# Patient Record
Sex: FEMALE | Employment: OTHER | URBAN - METROPOLITAN AREA
[De-identification: names, ages, dates, MRNs, and addresses within clinical notes are randomized per-mention and may not be internally consistent; named-entity substitution may affect disease eponyms.]

---

## 2019-12-04 ENCOUNTER — APPOINTMENT (OUTPATIENT)
Dept: GENERAL RADIOLOGY | Age: 74
DRG: 191 | End: 2019-12-04
Attending: EMERGENCY MEDICINE
Payer: MEDICARE

## 2019-12-04 ENCOUNTER — HOSPITAL ENCOUNTER (INPATIENT)
Age: 74
LOS: 2 days | Discharge: HOME | DRG: 191 | End: 2019-12-08
Attending: EMERGENCY MEDICINE | Admitting: INTERNAL MEDICINE
Payer: MEDICARE

## 2019-12-04 ENCOUNTER — APPOINTMENT (OUTPATIENT)
Dept: CT IMAGING | Age: 74
DRG: 191 | End: 2019-12-04
Attending: PHYSICIAN ASSISTANT
Payer: MEDICARE

## 2019-12-04 DIAGNOSIS — J44.1 CHRONIC OBSTRUCTIVE PULMONARY DISEASE WITH ACUTE EXACERBATION (HCC): Primary | ICD-10-CM

## 2019-12-04 LAB
ALBUMIN SERPL-MCNC: 3.9 G/DL (ref 3.5–5)
ALBUMIN/GLOB SERPL: 1.1 {RATIO} (ref 1.1–2.2)
ALP SERPL-CCNC: 74 U/L (ref 45–117)
ALT SERPL-CCNC: 26 U/L (ref 12–78)
ANION GAP SERPL CALC-SCNC: 8 MMOL/L (ref 5–15)
ARTERIAL PATENCY WRIST A: ABNORMAL
AST SERPL-CCNC: 20 U/L (ref 15–37)
BASE DEFICIT BLD-SCNC: 6 MMOL/L
BASOPHILS # BLD: 0.1 K/UL (ref 0–0.1)
BASOPHILS NFR BLD: 1 % (ref 0–1)
BDY SITE: ABNORMAL
BILIRUB SERPL-MCNC: 0.2 MG/DL (ref 0.2–1)
BUN SERPL-MCNC: 22 MG/DL (ref 6–20)
BUN/CREAT SERPL: 15 (ref 12–20)
CALCIUM SERPL-MCNC: 9.1 MG/DL (ref 8.5–10.1)
CHLORIDE SERPL-SCNC: 112 MMOL/L (ref 97–108)
CO2 SERPL-SCNC: 22 MMOL/L (ref 21–32)
CREAT SERPL-MCNC: 1.49 MG/DL (ref 0.55–1.02)
DIFFERENTIAL METHOD BLD: ABNORMAL
EOSINOPHIL # BLD: 0.3 K/UL (ref 0–0.4)
EOSINOPHIL NFR BLD: 2 % (ref 0–7)
ERYTHROCYTE [DISTWIDTH] IN BLOOD BY AUTOMATED COUNT: 14.8 % (ref 11.5–14.5)
GAS FLOW.O2 O2 DELIVERY SYS: ABNORMAL L/MIN
GLOBULIN SER CALC-MCNC: 3.5 G/DL (ref 2–4)
GLUCOSE SERPL-MCNC: 91 MG/DL (ref 65–100)
HCO3 BLD-SCNC: 19 MMOL/L (ref 22–26)
HCT VFR BLD AUTO: 44.6 % (ref 35–47)
HGB BLD-MCNC: 14.7 G/DL (ref 11.5–16)
IMM GRANULOCYTES # BLD AUTO: 0.1 K/UL (ref 0–0.04)
IMM GRANULOCYTES NFR BLD AUTO: 1 % (ref 0–0.5)
LACTATE BLD-SCNC: 0.88 MMOL/L (ref 0.4–2)
LYMPHOCYTES # BLD: 4 K/UL (ref 0.8–3.5)
LYMPHOCYTES NFR BLD: 28 % (ref 12–49)
MCH RBC QN AUTO: 29.9 PG (ref 26–34)
MCHC RBC AUTO-ENTMCNC: 33 G/DL (ref 30–36.5)
MCV RBC AUTO: 90.7 FL (ref 80–99)
MONOCYTES # BLD: 1.1 K/UL (ref 0–1)
MONOCYTES NFR BLD: 8 % (ref 5–13)
NEUTS SEG # BLD: 8.6 K/UL (ref 1.8–8)
NEUTS SEG NFR BLD: 60 % (ref 32–75)
NRBC # BLD: 0 K/UL (ref 0–0.01)
NRBC BLD-RTO: 0 PER 100 WBC
PCO2 BLD: 31.3 MMHG (ref 35–45)
PH BLD: 7.39 [PH] (ref 7.35–7.45)
PLATELET # BLD AUTO: 349 K/UL (ref 150–400)
PMV BLD AUTO: 9.2 FL (ref 8.9–12.9)
PO2 BLD: 69 MMHG (ref 80–100)
POTASSIUM SERPL-SCNC: 3.9 MMOL/L (ref 3.5–5.1)
PROT SERPL-MCNC: 7.4 G/DL (ref 6.4–8.2)
RBC # BLD AUTO: 4.92 M/UL (ref 3.8–5.2)
SAO2 % BLD: 94 % (ref 92–97)
SODIUM SERPL-SCNC: 142 MMOL/L (ref 136–145)
SPECIMEN TYPE: ABNORMAL
TOTAL RESP. RATE, ITRR: 18
WBC # BLD AUTO: 14.3 K/UL (ref 3.6–11)

## 2019-12-04 PROCEDURE — 71250 CT THORAX DX C-: CPT

## 2019-12-04 PROCEDURE — 74011000258 HC RX REV CODE- 258: Performed by: PHYSICIAN ASSISTANT

## 2019-12-04 PROCEDURE — 74011000250 HC RX REV CODE- 250: Performed by: PHYSICIAN ASSISTANT

## 2019-12-04 PROCEDURE — 71046 X-RAY EXAM CHEST 2 VIEWS: CPT

## 2019-12-04 PROCEDURE — 74011250636 HC RX REV CODE- 250/636: Performed by: PHYSICIAN ASSISTANT

## 2019-12-04 PROCEDURE — 36415 COLL VENOUS BLD VENIPUNCTURE: CPT

## 2019-12-04 PROCEDURE — 85025 COMPLETE CBC W/AUTO DIFF WBC: CPT

## 2019-12-04 PROCEDURE — 36600 WITHDRAWAL OF ARTERIAL BLOOD: CPT

## 2019-12-04 PROCEDURE — 87040 BLOOD CULTURE FOR BACTERIA: CPT

## 2019-12-04 PROCEDURE — 74011250636 HC RX REV CODE- 250/636: Performed by: HOSPITALIST

## 2019-12-04 PROCEDURE — 74011250637 HC RX REV CODE- 250/637: Performed by: HOSPITALIST

## 2019-12-04 PROCEDURE — 83605 ASSAY OF LACTIC ACID: CPT

## 2019-12-04 PROCEDURE — 82803 BLOOD GASES ANY COMBINATION: CPT

## 2019-12-04 PROCEDURE — 96367 TX/PROPH/DG ADDL SEQ IV INF: CPT

## 2019-12-04 PROCEDURE — 94640 AIRWAY INHALATION TREATMENT: CPT

## 2019-12-04 PROCEDURE — 96374 THER/PROPH/DIAG INJ IV PUSH: CPT

## 2019-12-04 PROCEDURE — 96375 TX/PRO/DX INJ NEW DRUG ADDON: CPT

## 2019-12-04 PROCEDURE — 99218 HC RM OBSERVATION: CPT

## 2019-12-04 PROCEDURE — 96372 THER/PROPH/DIAG INJ SC/IM: CPT

## 2019-12-04 PROCEDURE — 99285 EMERGENCY DEPT VISIT HI MDM: CPT

## 2019-12-04 PROCEDURE — 96365 THER/PROPH/DIAG IV INF INIT: CPT

## 2019-12-04 PROCEDURE — 80053 COMPREHEN METABOLIC PANEL: CPT

## 2019-12-04 PROCEDURE — 77030029684 HC NEB SM VOL KT MONA -A

## 2019-12-04 RX ORDER — ACETAMINOPHEN, DIPHENHYDRAMINE HCL, PHENYLEPHRINE HCL 325; 25; 5 MG/1; MG/1; MG/1
10 TABLET ORAL
COMMUNITY

## 2019-12-04 RX ORDER — ENOXAPARIN SODIUM 100 MG/ML
40 INJECTION SUBCUTANEOUS EVERY 24 HOURS
Status: DISCONTINUED | OUTPATIENT
Start: 2019-12-04 | End: 2019-12-05

## 2019-12-04 RX ORDER — AMLODIPINE BESYLATE 5 MG/1
5 TABLET ORAL DAILY
Status: ON HOLD | COMMUNITY
End: 2019-12-08 | Stop reason: SDUPTHER

## 2019-12-04 RX ORDER — SODIUM CHLORIDE 0.9 % (FLUSH) 0.9 %
5-40 SYRINGE (ML) INJECTION EVERY 8 HOURS
Status: DISCONTINUED | OUTPATIENT
Start: 2019-12-04 | End: 2019-12-08 | Stop reason: HOSPADM

## 2019-12-04 RX ORDER — IPRATROPIUM BROMIDE AND ALBUTEROL SULFATE 2.5; .5 MG/3ML; MG/3ML
3 SOLUTION RESPIRATORY (INHALATION)
Status: COMPLETED | OUTPATIENT
Start: 2019-12-04 | End: 2019-12-04

## 2019-12-04 RX ORDER — DONEPEZIL HYDROCHLORIDE 10 MG/1
10 TABLET, FILM COATED ORAL
COMMUNITY

## 2019-12-04 RX ORDER — IPRATROPIUM BROMIDE AND ALBUTEROL SULFATE 2.5; .5 MG/3ML; MG/3ML
3 SOLUTION RESPIRATORY (INHALATION)
Status: DISCONTINUED | OUTPATIENT
Start: 2019-12-04 | End: 2019-12-08 | Stop reason: HOSPADM

## 2019-12-04 RX ORDER — VITAMIN E 268 MG
400 CAPSULE ORAL DAILY
COMMUNITY

## 2019-12-04 RX ORDER — DULOXETIN HYDROCHLORIDE 30 MG/1
30 CAPSULE, DELAYED RELEASE ORAL DAILY
COMMUNITY

## 2019-12-04 RX ORDER — TRAZODONE HYDROCHLORIDE 100 MG/1
100 TABLET ORAL
Status: DISCONTINUED | OUTPATIENT
Start: 2019-12-04 | End: 2019-12-08 | Stop reason: HOSPADM

## 2019-12-04 RX ORDER — IPRATROPIUM BROMIDE AND ALBUTEROL SULFATE 2.5; .5 MG/3ML; MG/3ML
3 SOLUTION RESPIRATORY (INHALATION)
COMMUNITY

## 2019-12-04 RX ORDER — ERGOCALCIFEROL 1.25 MG/1
50000 CAPSULE ORAL
COMMUNITY

## 2019-12-04 RX ORDER — GUAIFENESIN/DEXTROMETHORPHAN 100-10MG/5
10 SYRUP ORAL
Status: DISCONTINUED | OUTPATIENT
Start: 2019-12-04 | End: 2019-12-08 | Stop reason: HOSPADM

## 2019-12-04 RX ORDER — LANOLIN ALCOHOL/MO/W.PET/CERES
1000 CREAM (GRAM) TOPICAL DAILY
COMMUNITY

## 2019-12-04 RX ORDER — TRAZODONE HYDROCHLORIDE 100 MG/1
100 TABLET ORAL
COMMUNITY

## 2019-12-04 RX ORDER — DULOXETIN HYDROCHLORIDE 30 MG/1
30 CAPSULE, DELAYED RELEASE ORAL DAILY
Status: DISCONTINUED | OUTPATIENT
Start: 2019-12-05 | End: 2019-12-08 | Stop reason: HOSPADM

## 2019-12-04 RX ORDER — ALBUTEROL SULFATE 0.83 MG/ML
2.5 SOLUTION RESPIRATORY (INHALATION)
Status: ACTIVE | OUTPATIENT
Start: 2019-12-04 | End: 2019-12-05

## 2019-12-04 RX ORDER — DONEPEZIL HYDROCHLORIDE 5 MG/1
10 TABLET, FILM COATED ORAL
Status: DISCONTINUED | OUTPATIENT
Start: 2019-12-04 | End: 2019-12-08 | Stop reason: HOSPADM

## 2019-12-04 RX ORDER — AMLODIPINE BESYLATE 5 MG/1
5 TABLET ORAL DAILY
Status: DISCONTINUED | OUTPATIENT
Start: 2019-12-05 | End: 2019-12-05

## 2019-12-04 RX ORDER — MEMANTINE HYDROCHLORIDE 10 MG/1
10 TABLET ORAL 2 TIMES DAILY
COMMUNITY

## 2019-12-04 RX ORDER — MEMANTINE HYDROCHLORIDE 10 MG/1
10 TABLET ORAL 2 TIMES DAILY
Status: DISCONTINUED | OUTPATIENT
Start: 2019-12-04 | End: 2019-12-08 | Stop reason: HOSPADM

## 2019-12-04 RX ORDER — BENZONATATE 100 MG/1
200 CAPSULE ORAL 3 TIMES DAILY
Status: DISCONTINUED | OUTPATIENT
Start: 2019-12-04 | End: 2019-12-08 | Stop reason: HOSPADM

## 2019-12-04 RX ORDER — LANOLIN ALCOHOL/MO/W.PET/CERES
9 CREAM (GRAM) TOPICAL
Status: DISCONTINUED | OUTPATIENT
Start: 2019-12-04 | End: 2019-12-08 | Stop reason: HOSPADM

## 2019-12-04 RX ORDER — GABAPENTIN 400 MG/1
400 CAPSULE ORAL DAILY
COMMUNITY

## 2019-12-04 RX ORDER — SODIUM CHLORIDE 0.9 % (FLUSH) 0.9 %
5-40 SYRINGE (ML) INJECTION AS NEEDED
Status: DISCONTINUED | OUTPATIENT
Start: 2019-12-04 | End: 2019-12-08 | Stop reason: HOSPADM

## 2019-12-04 RX ORDER — GABAPENTIN 400 MG/1
800 CAPSULE ORAL
COMMUNITY

## 2019-12-04 RX ADMIN — MELATONIN 9 MG: at 21:56

## 2019-12-04 RX ADMIN — DONEPEZIL HYDROCHLORIDE 10 MG: 5 TABLET, FILM COATED ORAL at 21:56

## 2019-12-04 RX ADMIN — Medication 5 ML: at 21:56

## 2019-12-04 RX ADMIN — IPRATROPIUM BROMIDE AND ALBUTEROL SULFATE 3 ML: .5; 3 SOLUTION RESPIRATORY (INHALATION) at 15:36

## 2019-12-04 RX ADMIN — METHYLPREDNISOLONE SODIUM SUCCINATE 125 MG: 125 INJECTION, POWDER, FOR SOLUTION INTRAMUSCULAR; INTRAVENOUS at 16:03

## 2019-12-04 RX ADMIN — BENZONATATE 200 MG: 100 CAPSULE ORAL at 21:56

## 2019-12-04 RX ADMIN — ENOXAPARIN SODIUM 40 MG: 40 INJECTION SUBCUTANEOUS at 21:56

## 2019-12-04 RX ADMIN — AZITHROMYCIN MONOHYDRATE 500 MG: 500 INJECTION, POWDER, LYOPHILIZED, FOR SOLUTION INTRAVENOUS at 23:10

## 2019-12-04 RX ADMIN — GABAPENTIN 800 MG: 100 CAPSULE ORAL at 21:56

## 2019-12-04 RX ADMIN — TRAZODONE HYDROCHLORIDE 100 MG: 100 TABLET ORAL at 21:55

## 2019-12-04 RX ADMIN — METHYLPREDNISOLONE SODIUM SUCCINATE 40 MG: 40 INJECTION, POWDER, FOR SOLUTION INTRAMUSCULAR; INTRAVENOUS at 23:11

## 2019-12-04 RX ADMIN — CEFTRIAXONE 1 G: 1 INJECTION, POWDER, FOR SOLUTION INTRAMUSCULAR; INTRAVENOUS at 21:57

## 2019-12-04 RX ADMIN — MEMANTINE HYDROCHLORIDE 10 MG: 10 TABLET ORAL at 21:59

## 2019-12-04 NOTE — ED PROVIDER NOTES
EMERGENCY DEPARTMENT HISTORY AND PHYSICAL EXAM      Date: 12/4/2019  Patient Name: Cora Francis    History of Presenting Illness     Chief Complaint   Patient presents with    Referral / Consult     Patient's PCP referred her here for pnemonia rule out she states she had bronchitis and it has not went away       History Provided By: Patient    HPI: Cora Francis, 66 y.o. female with PMHx significant for COPD, asthma, presents to the ED with cc of cough and shortness of breath. For the last \"few months\", the patient has had a productive cough. The cough is been gradually worsening and she has developed shortness of breath. She has seen her primary care provider, who has prescribed 2 rounds of antibiotics and steroids without relief of symptoms. When she went to see her PCP again today, she was referred to the emergency department for admission and IV antibiotics. She reports intermittent fevers and chest discomfort while coughing. She denies lower extremity edema or pain, abdominal pain, vomiting, diarrhea. She recently moved here with [place]. There are no other complaints, changes, or physical findings at this time. PCP: None    No current facility-administered medications on file prior to encounter. No current outpatient medications on file prior to encounter. Past History     Past Medical History:  No past medical history on file. COPD, asthma, Parkinson's disease, CKD, prediabetes    Past Surgical History:  No past surgical history on file. Family History:  No family history on file. Social History:  Social History     Tobacco Use    Smoking status: Current everyday smoker   Substance Use Topics    Alcohol use: Not on file    Drug use: Not on file       Allergies: Allergies   Allergen Reactions    Bee Venom Protein (Honey Bee) Itching    Codeine Hives    Tomato Hives         Review of Systems   Review of Systems   Constitutional: Positive for chills and fever.    HENT: Negative for ear pain and sore throat. Eyes: Negative for redness and visual disturbance. Respiratory: Positive for cough, shortness of breath and wheezing. Cardiovascular: Negative for chest pain and palpitations. Gastrointestinal: Negative for abdominal pain, nausea and vomiting. Genitourinary: Negative for dysuria and hematuria. Musculoskeletal: Negative for back pain and gait problem. Skin: Negative for rash and wound. Neurological: Negative for dizziness and headaches. Psychiatric/Behavioral: Negative for behavioral problems and confusion. All other systems reviewed and are negative. Physical Exam   Physical Exam  Vitals signs and nursing note reviewed. Constitutional:       Appearance: She is well-developed. Comments: Alert, conversant female who is able to speak in short phrases but then becomes dyspneic. She has a resting tremor. HENT:      Head: Normocephalic and atraumatic. Eyes:      Conjunctiva/sclera: Conjunctivae normal.      Pupils: Pupils are equal, round, and reactive to light. Neck:      Musculoskeletal: Normal range of motion and neck supple. Cardiovascular:      Rate and Rhythm: Normal rate and regular rhythm. Heart sounds: Normal heart sounds. Pulmonary:      Comments: Pt is mildly tachypneic with frequent coughing. Lung sounds are coarse throughout with expiratory wheezes in the bases. She is not in respiratory distress. Musculoskeletal: Normal range of motion. Skin:     General: Skin is warm and dry. Findings: No rash. Neurological:      Mental Status: She is alert and oriented to person, place, and time. GCS: GCS eye subscore is 4. GCS verbal subscore is 5. GCS motor subscore is 6. Cranial Nerves: No cranial nerve deficit. Sensory: No sensory deficit.    Psychiatric:         Behavior: Behavior normal.           Diagnostic Study Results     Labs -     Recent Results (from the past 12 hour(s))   POC LACTIC ACID Collection Time: 12/04/19  4:16 PM   Result Value Ref Range    Lactic Acid (POC) 0.88 0.40 - 2.00 mmol/L   CBC WITH AUTOMATED DIFF    Collection Time: 12/04/19  4:24 PM   Result Value Ref Range    WBC 14.3 (H) 3.6 - 11.0 K/uL    RBC 4.92 3.80 - 5.20 M/uL    HGB 14.7 11.5 - 16.0 g/dL    HCT 44.6 35.0 - 47.0 %    MCV 90.7 80.0 - 99.0 FL    MCH 29.9 26.0 - 34.0 PG    MCHC 33.0 30.0 - 36.5 g/dL    RDW 14.8 (H) 11.5 - 14.5 %    PLATELET 477 658 - 301 K/uL    MPV 9.2 8.9 - 12.9 FL    NRBC 0.0 0  WBC    ABSOLUTE NRBC 0.00 0.00 - 0.01 K/uL    NEUTROPHILS 60 32 - 75 %    LYMPHOCYTES 28 12 - 49 %    MONOCYTES 8 5 - 13 %    EOSINOPHILS 2 0 - 7 %    BASOPHILS 1 0 - 1 %    IMMATURE GRANULOCYTES 1 (H) 0.0 - 0.5 %    ABS. NEUTROPHILS 8.6 (H) 1.8 - 8.0 K/UL    ABS. LYMPHOCYTES 4.0 (H) 0.8 - 3.5 K/UL    ABS. MONOCYTES 1.1 (H) 0.0 - 1.0 K/UL    ABS. EOSINOPHILS 0.3 0.0 - 0.4 K/UL    ABS. BASOPHILS 0.1 0.0 - 0.1 K/UL    ABS. IMM. GRANS. 0.1 (H) 0.00 - 0.04 K/UL    DF AUTOMATED         Radiologic Studies -   XR CHEST PA LAT   Final Result   IMPRESSION: No acute cardiopulmonary disease. CTA CHEST W OR W WO CONT    (Results Pending)     CT Results  (Last 48 hours)    None        CXR Results  (Last 48 hours)               12/04/19 1539  XR CHEST PA LAT Final result    Impression:  IMPRESSION: No acute cardiopulmonary disease. Narrative: Indication: Persistent bronchitis, evaluate for pneumonia. Exam: PA and lateral views of the chest.       There is no prior study for direct comparison. Findings: Cardiomediastinal silhouette is within normal limits. Lungs are clear   bilaterally. Pleural spaces are normal. Osseous structures are intact. Medical Decision Making   I am the first provider for this patient. I reviewed the vital signs, available nursing notes, past medical history, past surgical history, family history and social history.     Vital Signs-Reviewed the patient's vital signs. Patient Vitals for the past 12 hrs:   Pulse Resp BP SpO2   12/04/19 1536    98 %   12/04/19 1513 81 20 157/72 97 %         Records Reviewed: Nursing Notes and Old Medical Records      Provider Notes (Medical Decision Making):   DDx: pneumonia, COPD exacerbation, malignancy, interstitial lung disease    Pt is mildly tachypneic with coarse breath sounds. She is maintaining adequate oxygenation. Plan to check labs and chest x-ray. Will treat with steroids and nebulizer. ED Course:   Initial assessment performed. The patients presenting problems have been discussed, and they are in agreement with the care plan formulated and outlined with them. I have encouraged them to ask questions as they arise throughout their visit. ED Course as of Dec 05 0003   Wed Dec 04, 2019   1539 Case discussed with Dr. Ruthanne Leyden, who agrees with the plan. [MAHOGANY]   1948 Pt was ambulated with pulse ox. She was only able to walk a few steps at a time before taking a break due to dyspnea. [MAHOGANY]      ED Course User Index  [MAHOGANY] [facility]     CONSULT NOTE:   Yifan Davis PA-C spoke with Dr. Brendon Duron,   Specialty: hospitalist  Discussed pt's hx, disposition, and available diagnostic and imaging results. Reviewed care plans. Consultant agrees with plans as outlined. Disposition:    ADMIT NOTE:  7:55 PM -   The patient is being admitted to the hospital by hospitalist.  The results of their tests and reasons for their admission have been discussed with the patient and/or available family. They convey agreement and understanding for the need to be admitted and for their admission diagnosis. Diagnosis     Clinical Impression:   1. Chronic obstructive pulmonary disease with acute exacerbation ([facility]            Jose Perry.  VENITA Raya

## 2019-12-05 LAB
ANION GAP SERPL CALC-SCNC: 10 MMOL/L (ref 5–15)
B PERT DNA SPEC QL NAA+PROBE: NOT DETECTED
BUN SERPL-MCNC: 24 MG/DL (ref 6–20)
BUN/CREAT SERPL: 15 (ref 12–20)
C PNEUM DNA SPEC QL NAA+PROBE: NOT DETECTED
CALCIUM SERPL-MCNC: 9 MG/DL (ref 8.5–10.1)
CHLORIDE SERPL-SCNC: 117 MMOL/L (ref 97–108)
CO2 SERPL-SCNC: 17 MMOL/L (ref 21–32)
CREAT SERPL-MCNC: 1.55 MG/DL (ref 0.55–1.02)
FLUAV H1 2009 PAND RNA SPEC QL NAA+PROBE: NOT DETECTED
FLUAV H1 RNA SPEC QL NAA+PROBE: NOT DETECTED
FLUAV H3 RNA SPEC QL NAA+PROBE: NOT DETECTED
FLUAV SUBTYP SPEC NAA+PROBE: NOT DETECTED
FLUBV RNA SPEC QL NAA+PROBE: NOT DETECTED
GLUCOSE SERPL-MCNC: 151 MG/DL (ref 65–100)
HADV DNA SPEC QL NAA+PROBE: NOT DETECTED
HCOV 229E RNA SPEC QL NAA+PROBE: NOT DETECTED
HCOV HKU1 RNA SPEC QL NAA+PROBE: NOT DETECTED
HCOV NL63 RNA SPEC QL NAA+PROBE: NOT DETECTED
HCOV OC43 RNA SPEC QL NAA+PROBE: NOT DETECTED
HMPV RNA SPEC QL NAA+PROBE: NOT DETECTED
HPIV1 RNA SPEC QL NAA+PROBE: NOT DETECTED
HPIV2 RNA SPEC QL NAA+PROBE: NOT DETECTED
HPIV3 RNA SPEC QL NAA+PROBE: NOT DETECTED
HPIV4 RNA SPEC QL NAA+PROBE: NOT DETECTED
M PNEUMO DNA SPEC QL NAA+PROBE: NOT DETECTED
POTASSIUM SERPL-SCNC: 4.3 MMOL/L (ref 3.5–5.1)
RSV RNA SPEC QL NAA+PROBE: NOT DETECTED
RV+EV RNA SPEC QL NAA+PROBE: NOT DETECTED
SODIUM SERPL-SCNC: 144 MMOL/L (ref 136–145)

## 2019-12-05 PROCEDURE — 74011250636 HC RX REV CODE- 250/636: Performed by: HOSPITALIST

## 2019-12-05 PROCEDURE — 94640 AIRWAY INHALATION TREATMENT: CPT

## 2019-12-05 PROCEDURE — 96366 THER/PROPH/DIAG IV INF ADDON: CPT

## 2019-12-05 PROCEDURE — 74011250636 HC RX REV CODE- 250/636: Performed by: INTERNAL MEDICINE

## 2019-12-05 PROCEDURE — 74011000250 HC RX REV CODE- 250: Performed by: HOSPITALIST

## 2019-12-05 PROCEDURE — 80048 BASIC METABOLIC PNL TOTAL CA: CPT

## 2019-12-05 PROCEDURE — 0099U RESPIRATORY PANEL,PCR,NASOPHARYNGEAL: CPT

## 2019-12-05 PROCEDURE — 74011250637 HC RX REV CODE- 250/637: Performed by: HOSPITALIST

## 2019-12-05 PROCEDURE — 99218 HC RM OBSERVATION: CPT

## 2019-12-05 PROCEDURE — 96376 TX/PRO/DX INJ SAME DRUG ADON: CPT

## 2019-12-05 PROCEDURE — 74011250637 HC RX REV CODE- 250/637: Performed by: INTERNAL MEDICINE

## 2019-12-05 PROCEDURE — 94760 N-INVAS EAR/PLS OXIMETRY 1: CPT

## 2019-12-05 PROCEDURE — 96372 THER/PROPH/DIAG INJ SC/IM: CPT

## 2019-12-05 PROCEDURE — 36415 COLL VENOUS BLD VENIPUNCTURE: CPT

## 2019-12-05 RX ORDER — HYDRALAZINE HYDROCHLORIDE 20 MG/ML
10 INJECTION INTRAMUSCULAR; INTRAVENOUS
Status: DISCONTINUED | OUTPATIENT
Start: 2019-12-05 | End: 2019-12-08 | Stop reason: HOSPADM

## 2019-12-05 RX ORDER — FLUTICASONE FUROATE AND VILANTEROL 100; 25 UG/1; UG/1
1 POWDER RESPIRATORY (INHALATION) DAILY
Status: DISCONTINUED | OUTPATIENT
Start: 2019-12-05 | End: 2019-12-08 | Stop reason: HOSPADM

## 2019-12-05 RX ORDER — AMLODIPINE BESYLATE 5 MG/1
10 TABLET ORAL DAILY
Status: DISCONTINUED | OUTPATIENT
Start: 2019-12-05 | End: 2019-12-08 | Stop reason: HOSPADM

## 2019-12-05 RX ORDER — ONDANSETRON 2 MG/ML
4 INJECTION INTRAMUSCULAR; INTRAVENOUS
Status: DISCONTINUED | OUTPATIENT
Start: 2019-12-05 | End: 2019-12-08 | Stop reason: HOSPADM

## 2019-12-05 RX ORDER — ACETAMINOPHEN 325 MG/1
650 TABLET ORAL
Status: DISCONTINUED | OUTPATIENT
Start: 2019-12-05 | End: 2019-12-08 | Stop reason: HOSPADM

## 2019-12-05 RX ORDER — SODIUM CHLORIDE 9 MG/ML
75 INJECTION, SOLUTION INTRAVENOUS CONTINUOUS
Status: DISPENSED | OUTPATIENT
Start: 2019-12-05 | End: 2019-12-05

## 2019-12-05 RX ORDER — HYDROCODONE BITARTRATE AND ACETAMINOPHEN 5; 325 MG/1; MG/1
1 TABLET ORAL ONCE
Status: COMPLETED | OUTPATIENT
Start: 2019-12-05 | End: 2019-12-05

## 2019-12-05 RX ORDER — GUAIFENESIN 600 MG/1
600 TABLET, EXTENDED RELEASE ORAL EVERY 12 HOURS
Status: DISCONTINUED | OUTPATIENT
Start: 2019-12-05 | End: 2019-12-08 | Stop reason: HOSPADM

## 2019-12-05 RX ORDER — ENOXAPARIN SODIUM 100 MG/ML
30 INJECTION SUBCUTANEOUS EVERY 24 HOURS
Status: DISCONTINUED | OUTPATIENT
Start: 2019-12-05 | End: 2019-12-08 | Stop reason: HOSPADM

## 2019-12-05 RX ADMIN — MELATONIN 9 MG: at 22:27

## 2019-12-05 RX ADMIN — METHYLPREDNISOLONE SODIUM SUCCINATE 40 MG: 40 INJECTION, POWDER, FOR SOLUTION INTRAMUSCULAR; INTRAVENOUS at 05:21

## 2019-12-05 RX ADMIN — BENZONATATE 200 MG: 100 CAPSULE ORAL at 15:55

## 2019-12-05 RX ADMIN — GABAPENTIN 400 MG: 100 CAPSULE ORAL at 08:04

## 2019-12-05 RX ADMIN — BENZONATATE 200 MG: 100 CAPSULE ORAL at 21:21

## 2019-12-05 RX ADMIN — AMLODIPINE BESYLATE 10 MG: 5 TABLET ORAL at 08:04

## 2019-12-05 RX ADMIN — MEMANTINE HYDROCHLORIDE 10 MG: 10 TABLET ORAL at 08:05

## 2019-12-05 RX ADMIN — Medication 10 ML: at 14:21

## 2019-12-05 RX ADMIN — Medication 5 ML: at 05:22

## 2019-12-05 RX ADMIN — METHYLPREDNISOLONE SODIUM SUCCINATE 40 MG: 40 INJECTION, POWDER, FOR SOLUTION INTRAMUSCULAR; INTRAVENOUS at 21:22

## 2019-12-05 RX ADMIN — ENOXAPARIN SODIUM 30 MG: 30 INJECTION SUBCUTANEOUS at 20:15

## 2019-12-05 RX ADMIN — ACETAMINOPHEN 650 MG: 325 TABLET ORAL at 08:52

## 2019-12-05 RX ADMIN — GABAPENTIN 800 MG: 100 CAPSULE ORAL at 22:31

## 2019-12-05 RX ADMIN — GUAIFENESIN AND DEXTROMETHORPHAN 10 ML: 100; 10 SYRUP ORAL at 20:14

## 2019-12-05 RX ADMIN — DONEPEZIL HYDROCHLORIDE 10 MG: 5 TABLET, FILM COATED ORAL at 21:22

## 2019-12-05 RX ADMIN — GUAIFENESIN 600 MG: 600 TABLET, EXTENDED RELEASE ORAL at 22:26

## 2019-12-05 RX ADMIN — METHYLPREDNISOLONE SODIUM SUCCINATE 40 MG: 40 INJECTION, POWDER, FOR SOLUTION INTRAMUSCULAR; INTRAVENOUS at 14:21

## 2019-12-05 RX ADMIN — Medication 10 ML: at 21:22

## 2019-12-05 RX ADMIN — HYDROCODONE BITARTRATE AND ACETAMINOPHEN 1 TABLET: 5; 325 TABLET ORAL at 21:20

## 2019-12-05 RX ADMIN — TRAZODONE HYDROCHLORIDE 100 MG: 100 TABLET ORAL at 22:27

## 2019-12-05 RX ADMIN — ACETAMINOPHEN 650 MG: 325 TABLET ORAL at 15:54

## 2019-12-05 RX ADMIN — MEMANTINE HYDROCHLORIDE 10 MG: 10 TABLET ORAL at 18:07

## 2019-12-05 RX ADMIN — SODIUM CHLORIDE 75 ML/HR: 900 INJECTION, SOLUTION INTRAVENOUS at 08:55

## 2019-12-05 RX ADMIN — IPRATROPIUM BROMIDE AND ALBUTEROL SULFATE 3 ML: .5; 3 SOLUTION RESPIRATORY (INHALATION) at 06:00

## 2019-12-05 RX ADMIN — BENZONATATE 200 MG: 100 CAPSULE ORAL at 08:04

## 2019-12-05 RX ADMIN — DULOXETINE HYDROCHLORIDE 30 MG: 30 CAPSULE, DELAYED RELEASE ORAL at 08:04

## 2019-12-05 RX ADMIN — AZITHROMYCIN MONOHYDRATE 500 MG: 500 INJECTION, POWDER, LYOPHILIZED, FOR SOLUTION INTRAVENOUS at 23:09

## 2019-12-05 RX ADMIN — FLUTICASONE FUROATE AND VILANTEROL TRIFENATATE 1 PUFF: 100; 25 POWDER RESPIRATORY (INHALATION) at 08:02

## 2019-12-05 RX ADMIN — GUAIFENESIN 600 MG: 600 TABLET, EXTENDED RELEASE ORAL at 08:05

## 2019-12-05 NOTE — PROGRESS NOTES
Oncology End of Shift Note      Bedside shift change report given to ZION Arellano (incoming nurse) by Jerry Hickey (outgoing nurse) on [facility]. Report included the following information SBAR, Kardex and MAR. Shift Summary:   Patient tolerated care well throughout shift. Hourly rounding completed. Education provided regarding all medications and room safety. Patient ambulated in room with stand by. Vitals assessed. Issues for Physician to Address:       Patient on Cardiac Monitoring?     [] Yes  [x] No    Rhythm:         Shift Events        Jerry Hickey

## 2019-12-05 NOTE — ED NOTES
TRANSFER - OUT REPORT:    Verbal report given to ZION Gonzalez(name) on Quinn Mcadams  being transferred to [id number](unit) for routine progression of care       Report consisted of patients Situation, Background, Assessment and   Recommendations(SBAR). Information from the following report(s) SBAR, Kardex, ED Summary, [facility] and Recent Results was reviewed with the receiving nurse. Lines:   Peripheral IV 12/04/19 Right Hand (Active)   Site Assessment Clean, dry, & intact 12/4/2019  4:32 PM   Phlebitis Assessment 0 12/4/2019  4:32 PM   Infiltration Assessment 0 12/4/2019  4:32 PM   Dressing Status Clean, dry, & intact 12/4/2019  4:32 PM   Dressing Type Transparent 12/4/2019  4:32 PM   Hub Color/Line Status Pink 12/4/2019  4:32 PM        Opportunity for questions and clarification was provided.       Patient transported with:   Tech/Transport

## 2019-12-05 NOTE — ED NOTES
Report received from [facility]. Advised of patient's chief complaint, orders that are completed, any outstanding orders that still need to be completed, and the current treatment plan. All questions addressed prior to assumption of patient care at this time.

## 2019-12-05 NOTE — H&P
Hospitalist Admission Note    NAME: Quinn Mcadams   :  1945   MRN:  [id number]     Date/Time:  2019 8:26 PM    Patient PCP: None  ______________________________________________________________________  Given the patient's current clinical presentation, I have a high level of concern for decompensation if discharged from the emergency department. Complex decision making was performed, which includes reviewing the patient's available past medical records, laboratory results, and x-ray films. My assessment of this patient's clinical condition and my plan of care is as follows. Assessment / Plan:    Exertional shortness of breath and cough  Secondary COPD with acute exacerbation   due to Acute bronchitis, POA  Continuous Tobacco abuse  -sats in mid 90's on RA, RR 20  -afebrile, normal wbc and CXR neg for PNA  -initiated on IV steroids, PRN duo nebs  -empiric azithromycin  -Resp panel  -start on breo-ellipta   -scheduled tessalon pearls and PRN robitussin  -outpt pulm referral  counseled extensively regarding tobacco use  -nicotine patch offered    Hypertension POA  -continue amlodipine    Parkinson's disease per pt's PCP  -not on treatment or seen a neurologist yet    Dementia  -diagnosed by PCP in [place]  -cont namenda and Aricept    On gabapentin and cymbalta, pt doesn't now why she takes this medications        Code Status: full   Surrogate Decision Maker: NOK sister, Kadeem Woody, has son who is sick and in hospital    DVT Prophylaxis: lovenox  GI Prophylaxis: not indicated    Baseline: lives with sister      Subjective:   CHIEF COMPLAINT: shortness of breath and cough    HISTORY OF PRESENT ILLNESS:      Quinn Mcadams is a 66 y.o. PMHx significant for COPD,  continuous tobacco abuse, dementia, Parkinson's disease presented to ER with above symptoms.   Patient states she has been having symptoms for almost 2 months now initially it started with upper respiratory tract symptoms but her cough never improved. She has tried outpatient antibiotics with no improvement. Denies any fever or sore throat. Cough is productive of yellow sputum. States her sister had similar symptoms but her symptoms have since resolved. Patient has never seen a pulmonologist for COPD. She recently moved from [place] to [place] to take care of her sister she does have a primary care physician here. In ER she was noticed to be tachypneic with respiratory rate in 20s but her sats have been in 90s on room air. ER work-up is negative for any acute infection. She received multiple treatment of nebs and IV steroids with minimal improvement. We were asked to admit for work up and evaluation of the above problems. No past medical history on file. No past surgical history on file. Social History     Tobacco Use    Smoking status: Not on file   Substance Use Topics    Alcohol use: Not on file        Family history: htn. Allergies   Allergen Reactions    Bee Venom Protein (Honey Bee) Itching    Codeine Hives    Tomato Hives        Prior to Admission medications    Medication Sig Start Date End Date Taking? Authorizing Provider   amLODIPine (NORVASC) 5 mg tablet Take 5 mg by mouth daily. Yes Provider, Historical   donepezil (ARICEPT) 10 mg tablet Take 10 mg by mouth nightly. Yes Provider, Historical   DULoxetine (CYMBALTA) 30 mg capsule Take 30 mg by mouth daily. Yes Provider, Historical   gabapentin (NEURONTIN) 400 mg capsule Take 400 mg by mouth daily. Patient takes 400 mg QAM and 800 mg QHS   Yes Provider, Historical   gabapentin (NEURONTIN) 400 mg capsule Take 800 mg by mouth nightly. Patient takes 400 mg QAM and 800 mg QHS   Yes Provider, Historical   albuterol-ipratropium (DUO-NEB) 2.5 mg-0.5 mg/3 ml nebu 3 mL by Nebulization route every six (6) hours as needed (SOB). Yes Provider, Historical   traZODone (DESYREL) 100 mg tablet Take 100 mg by mouth nightly.    Yes Provider, Historical   melatonin 10 mg tab Take 10 mg by mouth nightly. Yes Provider, Historical   L-LYSINE PO Take 1 Tab by mouth nightly. Yes Provider, Historical   vitamin E (AQUA GEMS) 400 unit capsule Take 400 Units by mouth daily. Yes Provider, Historical   ergocalciferol (ERGOCALCIFEROL) 50,000 unit capsule Take 50,000 Units by mouth Every Saturday. Yes Provider, Historical   cyanocobalamin 1,000 mcg tablet Take 1,000 mcg by mouth daily. Yes Provider, Historical   memantine (NAMENDA) 10 mg tablet Take 10 mg by mouth two (2) times a day. Yes Provider, Historical       REVIEW OF SYSTEMS:     I am not able to complete the review of systems because:    The patient is intubated and sedated    The patient has altered mental status due to his acute medical problems    The patient has baseline aphasia from prior stroke(s)    The patient has baseline dementia and is not reliable historian    The patient is in acute medical distress and unable to provide information           Total of 12 systems reviewed as follows:       POSITIVE= underlined text  Negative = text not underlined  General:  fever, chills, sweats, generalized weakness, weight loss/gain,      loss of appetite   Eyes:    blurred vision, eye pain, loss of vision, double vision  ENT:    rhinorrhea, pharyngitis   Respiratory:   cough, sputum production, SOB, MONTANO, wheezing, pleuritic pain   Cardiology:   chest pain, palpitations, orthopnea, PND, edema, syncope   Gastrointestinal:  abdominal pain , N/V, diarrhea, dysphagia, constipation, bleeding   Genitourinary:  frequency, urgency, dysuria, hematuria, incontinence   Muskuloskeletal :  arthralgia, myalgia, back pain  Hematology:  easy bruising, nose or gum bleeding, lymphadenopathy   Dermatological: rash, ulceration, pruritis, color change / jaundice  Endocrine:   hot flashes or polydipsia   Neurological:  headache, dizziness, confusion, focal weakness, paresthesia,     Speech difficulties, memory loss, gait difficulty  Psychological: Feelings of anxiety, depression, agitation    Objective:   VITALS:    Visit Vitals  /77   Pulse 80   Temp 98.1 °F (36.7 °C)   Resp 20   Ht 5' 1\" (1.549 m)   Wt 77.6 kg (171 lb 1.2 oz)   SpO2 95%   BMI 32.32 kg/m²       PHYSICAL EXAM:    General:    Alert, cooperative, no distress, appears stated age. HEENT: Atraumatic, anicteric sclerae, pink conjunctivae     No oral ulcers, mucosa moist, throat clear, dentition fair  Neck:  Supple, symmetrical,  thyroid: non tender  Lungs:   Harsh b/l with wheezing on right side  Chest wall:  No tenderness  No Accessory muscle use. Heart:   Regular  rhythm,  No  murmur   No edema  Abdomen:   Soft, non-tender. Not distended. Bowel sounds normal  Extremities: No cyanosis. No clubbing,      Skin turgor normal, Capillary refill normal, Radial dial pulse 2+  Skin:     Not pale. Not Jaundiced  No rashes   Psych:  Good insight. Not depressed. Not anxious or agitated. Neurologic: EOMs intact. No facial asymmetry. No aphasia or slurred speech. Symmetrical strength, Sensation grossly intact.  Alert and oriented X 4.     _______________________________________________________________________  Care Plan discussed with:    Comments   Patient x    Family      RN x    Care Manager                    Consultant:      _______________________________________________________________________  Expected  Disposition:   Home with Family x   HH/PT/OT/RN    SNF/LTC    DAVID    ________________________________________________________________________  TOTAL TIME:   Minutes    Critical Care Provided     Minutes non procedure based      Comments     Reviewed previous records   >50% of visit spent in counseling and coordination of care  Discussion with patient and/or family and questions answered       ________________________________________________________________________  Signed: Medina Harris MD    Procedures: see electronic medical records for all procedures/Xrays and details which were not copied into this note but were reviewed prior to creation of Plan. LAB DATA REVIEWED:    Recent Results (from the past 24 hour(s))   POC LACTIC ACID    Collection Time: 12/04/19  4:16 PM   Result Value Ref Range    Lactic Acid (POC) 0.88 0.40 - 2.00 mmol/L   CBC WITH AUTOMATED DIFF    Collection Time: 12/04/19  4:24 PM   Result Value Ref Range    WBC 14.3 (H) 3.6 - 11.0 K/uL    RBC 4.92 3.80 - 5.20 M/uL    HGB 14.7 11.5 - 16.0 g/dL    HCT 44.6 35.0 - 47.0 %    MCV 90.7 80.0 - 99.0 FL    MCH 29.9 26.0 - 34.0 PG    MCHC 33.0 30.0 - 36.5 g/dL    RDW 14.8 (H) 11.5 - 14.5 %    PLATELET 719 419 - 330 K/uL    MPV 9.2 8.9 - 12.9 FL    NRBC 0.0 0  WBC    ABSOLUTE NRBC 0.00 0.00 - 0.01 K/uL    NEUTROPHILS 60 32 - 75 %    LYMPHOCYTES 28 12 - 49 %    MONOCYTES 8 5 - 13 %    EOSINOPHILS 2 0 - 7 %    BASOPHILS 1 0 - 1 %    IMMATURE GRANULOCYTES 1 (H) 0.0 - 0.5 %    ABS. NEUTROPHILS 8.6 (H) 1.8 - 8.0 K/UL    ABS. LYMPHOCYTES 4.0 (H) 0.8 - 3.5 K/UL    ABS. MONOCYTES 1.1 (H) 0.0 - 1.0 K/UL    ABS. EOSINOPHILS 0.3 0.0 - 0.4 K/UL    ABS. BASOPHILS 0.1 0.0 - 0.1 K/UL    ABS. IMM. GRANS. 0.1 (H) 0.00 - 0.04 K/UL    DF AUTOMATED     METABOLIC PANEL, COMPREHENSIVE    Collection Time: 12/04/19  4:24 PM   Result Value Ref Range    Sodium 142 136 - 145 mmol/L    Potassium 3.9 3.5 - 5.1 mmol/L    Chloride 112 (H) 97 - 108 mmol/L    CO2 22 21 - 32 mmol/L    Anion gap 8 5 - 15 mmol/L    Glucose 91 65 - 100 mg/dL    BUN 22 (H) 6 - 20 MG/DL    Creatinine 1.49 (H) 0.55 - 1.02 MG/DL    BUN/Creatinine ratio 15 12 - 20      GFR est AA 41 (L) >60 ml/min/1.73m2    GFR est non-AA 34 (L) >60 ml/min/1.73m2    Calcium 9.1 8.5 - 10.1 MG/DL    Bilirubin, total 0.2 0.2 - 1.0 MG/DL    ALT (SGPT) 26 12 - 78 U/L    AST (SGOT) 20 15 - 37 U/L    Alk.  phosphatase 74 45 - 117 U/L    Protein, total 7.4 6.4 - 8.2 g/dL    Albumin 3.9 3.5 - 5.0 g/dL    Globulin 3.5 2.0 - 4.0 g/dL    A-G Ratio 1.1 1.1 - 2.2

## 2019-12-05 NOTE — PROGRESS NOTES
Pharmacy Clarification of Prior to Admission Medication Regimen     The patient was interviewed regarding clarification of the prior to admission medication regimen and was questioned regarding use of any other inhalers, topical products, over the counter medications, herbal medications, vitamin products or ophthalmic/nasal/otic medication use. Information Obtained From: Patient, prescription bottles, RX Query    Pertinent Pharmacy Findings:  Updated patients preferred outpatient pharmacy to: [place] [address], [address] [facility] medication list was corrected to the following:     Prior to Admission Medications   Prescriptions Last Dose Informant Taking? DULoxetine (CYMBALTA) 30 mg capsule 12/4/2019 at Unknown time Self Yes   Sig: Take 30 mg by mouth daily. L-LYSINE PO 12/3/2019 at Unknown time Self Yes   Sig: Take 1 Tab by mouth nightly. albuterol-ipratropium (DUO-NEB) 2.5 mg-0.5 mg/3 ml nebu 12/4/2019 at Unknown time Self Yes   Sig: 3 mL by Nebulization route every six (6) hours as needed (SOB). amLODIPine (NORVASC) 5 mg tablet 12/4/2019 at Unknown time Self Yes   Sig: Take 5 mg by mouth daily. cyanocobalamin 1,000 mcg tablet 12/4/2019 at Unknown time Self Yes   Sig: Take 1,000 mcg by mouth daily. donepezil (ARICEPT) 10 mg tablet 12/3/2019 at Unknown time Self Yes   Sig: Take 10 mg by mouth nightly.   ergocalciferol (ERGOCALCIFEROL) 50,000 unit capsule 11/30/2019 at Unknown time Other Yes   Sig: Take 50,000 Units by mouth Every Saturday. gabapentin (NEURONTIN) 400 mg capsule 12/4/2019 at Unknown time Self Yes   Sig: Take 400 mg by mouth daily. Patient takes 400 mg QAM and 800 mg QHS   gabapentin (NEURONTIN) 400 mg capsule 12/3/2019 at Unknown time Self Yes   Sig: Take 800 mg by mouth nightly. Patient takes 400 mg QAM and 800 mg QHS   melatonin 10 mg tab 12/3/2019 at Unknown time Self Yes   Sig: Take 10 mg by mouth nightly.    memantine (NAMENDA) 10 mg tablet 12/4/2019 at Unknown time Other Yes   Sig: Take 10 mg by mouth two (2) times a day. traZODone (DESYREL) 100 mg tablet 12/3/2019 at Unknown time Self Yes   Sig: Take 100 mg by mouth nightly. vitamin E (AQUA GEMS) 400 unit capsule 12/4/2019 at Unknown time Self Yes   Sig: Take 400 Units by mouth daily.       Facility-Administered Medications: None          Junior Pendleton CPhT  Medication History Pharmacy Technician

## 2019-12-05 NOTE — PROGRESS NOTES
Hospitalist Progress Note    NAME: Milo Steward   :  1945   MRN:  [id number]       Assessment / Plan:  Exertional shortness of breath and cough  Secondary COPD with acute exacerbation   due to Acute bronchitis, POA  Continuous Tobacco abuse  -maintaining SpO2 ~94% on RA. She is very congested, takes break during conversation. Apparently symptoms has been on going for 3 weeks. -CXR neg for PNA  -cont' empiric abx, IV steroids, PRN duo nebs  -add mucinex, breo  -Resp panel pending  -start on breo-ellipta   -outpt pulm referral  -counseled extensively regarding tobacco use  -nicotine patch offered     PREMA vs CKD3?  -no previous labs. Cr at 1.5 on admission  -avoid nephrotoxic agents. -encourage PO intake, start IVF x12 hrs  -repeat bmp in the AM    Hypertension POA  -continue amlodipine, increase dose  -add IV hydralazine prn     Parkinson's disease per pt's PCP  -not on treatment or seen a neurologist yet. Needs referral at discharge     Dementia  -diagnosed by PCP in [place]. She was AAOx4 during my visit.  -cont namenda and Aricept     On gabapentin and cymbalta, pt doesn't now why she takes this medications           Code Status: full   Surrogate Decision Maker: NOK sister, Khadijah Burns, has son who is sick and in hospital   DVT Prophylaxis: lovenox  GI Prophylaxis: not indicated   Baseline: lives with sister  Overweight / Body mass index is 32.32 kg/m². Subjective:     Chief Complaint / Reason for Physician Visit  Pt seen. Denies cp but has sob with during conversations. She is congested, cough but unable to bring up sputum. Discussed with RN events overnight.      Review of Systems:  Symptom Y/N Comments  Symptom Y/N Comments   Fever/Chills n   Chest Pain n    Poor Appetite    Edema     Cough y   Abdominal Pain     Sputum n   Joint Pain     SOB/MONTANO y   Pruritis/Rash     Nausea/vomit    Tolerating PT/OT     Diarrhea    Tolerating Diet     Constipation    Other       Could NOT obtain due to:      Objective:     VITALS:   Last 24hrs VS reviewed since prior progress note. Most recent are:  Patient Vitals for the past 24 hrs:   Temp Pulse Resp BP SpO2   12/05/19 0541     94 %   12/04/19 2207 98.3 °F (36.8 °C) 66 20 170/83    12/04/19 1915    136/77 95 %   12/04/19 1815 98.1 °F (36.7 °C) 80  159/70 95 %   12/04/19 1700    138/73 95 %   12/04/19 1645    (!) 134/97 96 %   12/04/19 1615    140/73 96 %   12/04/19 1536     98 %   12/04/19 1530    (!) 176/93 99 %   12/04/19 1513 98.2 °F (36.8 °C) 81 20 157/72 97 %       Intake/Output Summary (Last 24 hours) at 12/5/2019 0731  Last data filed at 12/5/2019 0110  Gross per 24 hour   Intake 750 ml   Output    Net 750 ml        PHYSICAL EXAM:  General: WD, WN. Alert, cooperative, no acute distress    EENT:  EOMI. Anicteric sclerae. MMM  Resp:  Coarse bs b/l. no wheezing or rales. No accessory muscle use  CV:  Regular  rhythm,  No edema  GI:  Soft, Non distended, Non tender.  +Bowel sounds  Neurologic:  Alert and oriented X 3, normal speech  Psych:   Not anxious nor agitated  Skin:  No rashes. No jaundice    Reviewed most current lab test results and cultures  YES  Reviewed most current radiology test results   YES  Review and summation of old records today    NO  Reviewed patient's current orders and MAR    YES  PMH/SH reviewed - no change compared to H&P  ________________________________________________________________________  Care Plan discussed with:    Comments   Patient x    Family      RN x    Care Manager     Consultant                        Multidiciplinary team rounds were held today with , nursing, pharmacist and clinical coordinator. Patient's plan of care was discussed; medications were reviewed and discharge planning was addressed.      ________________________________________________________________________  Total NON critical care TIME:  35  Minutes    Total CRITICAL CARE TIME Spent:   Minutes non procedure based      Comments   >50% of visit spent in counseling and coordination of care     ________________________________________________________________________  Ellouise Alpers, MD     Procedures: see electronic medical records for all procedures/Xrays and details which were not copied into this note but were reviewed prior to creation of Plan. LABS:  I reviewed today's most current labs and imaging studies.   Pertinent labs include:  Recent Labs     12/04/19  1624   WBC 14.3*   HGB 14.7   HCT 44.6        Recent Labs     12/05/19  0527 12/04/19  1624    142   K 4.3 3.9   * 112*   CO2 17* 22   * 91   BUN 24* 22*   CREA 1.55* 1.49*   CA 9.0 9.1   ALB  --  3.9   TBILI  --  0.2   SGOT  --  20   ALT  --  26       Signed: Ellouise Alpers, MD

## 2019-12-05 NOTE — ED NOTES
Pt ambulated in hallway with ED tech. Pt oxygen sats stayed in normal range but patient was very short of breath and had to stop every couple feet to catch her breath. Pt had audible wheezing as well. MINNIE Raya made aware.

## 2019-12-05 NOTE — ED NOTES
Patient presents to the ED ambulatory complaining of a cough. Patient reports her PCP referred her to the ED to rule out pneumonia. Patient reports a productive cough, wheezing and shortness of breath for the last month. Patient states she was referred to the ED weeks ago but was unable to come to the ED. Patient placed on the monitor x2 and provided with her call aldridge. VENITA Raya at bedside.

## 2019-12-06 PROBLEM — J44.9 COPD (CHRONIC OBSTRUCTIVE PULMONARY DISEASE) (HCC): Status: ACTIVE | Noted: 2019-12-06

## 2019-12-06 LAB
ANION GAP SERPL CALC-SCNC: 8 MMOL/L (ref 5–15)
BASOPHILS # BLD: 0 K/UL (ref 0–0.1)
BASOPHILS NFR BLD: 0 % (ref 0–1)
BUN SERPL-MCNC: 24 MG/DL (ref 6–20)
BUN/CREAT SERPL: 17 (ref 12–20)
CALCIUM SERPL-MCNC: 8.4 MG/DL (ref 8.5–10.1)
CHLORIDE SERPL-SCNC: 115 MMOL/L (ref 97–108)
CO2 SERPL-SCNC: 20 MMOL/L (ref 21–32)
CREAT SERPL-MCNC: 1.43 MG/DL (ref 0.55–1.02)
DIFFERENTIAL METHOD BLD: ABNORMAL
EOSINOPHIL # BLD: 0 K/UL (ref 0–0.4)
EOSINOPHIL NFR BLD: 0 % (ref 0–7)
ERYTHROCYTE [DISTWIDTH] IN BLOOD BY AUTOMATED COUNT: 15.1 % (ref 11.5–14.5)
GLUCOSE SERPL-MCNC: 158 MG/DL (ref 65–100)
HCT VFR BLD AUTO: 42.2 % (ref 35–47)
HGB BLD-MCNC: 13.7 G/DL (ref 11.5–16)
IMM GRANULOCYTES # BLD AUTO: 0.3 K/UL (ref 0–0.04)
IMM GRANULOCYTES NFR BLD AUTO: 1 % (ref 0–0.5)
LYMPHOCYTES # BLD: 1.6 K/UL (ref 0.8–3.5)
LYMPHOCYTES NFR BLD: 6 % (ref 12–49)
MCH RBC QN AUTO: 29.8 PG (ref 26–34)
MCHC RBC AUTO-ENTMCNC: 32.5 G/DL (ref 30–36.5)
MCV RBC AUTO: 91.7 FL (ref 80–99)
MONOCYTES # BLD: 0.8 K/UL (ref 0–1)
MONOCYTES NFR BLD: 3 % (ref 5–13)
NEUTS SEG # BLD: 23.7 K/UL (ref 1.8–8)
NEUTS SEG NFR BLD: 90 % (ref 32–75)
NRBC # BLD: 0 K/UL (ref 0–0.01)
NRBC BLD-RTO: 0 PER 100 WBC
PLATELET # BLD AUTO: 329 K/UL (ref 150–400)
PMV BLD AUTO: 9.1 FL (ref 8.9–12.9)
POTASSIUM SERPL-SCNC: 4.5 MMOL/L (ref 3.5–5.1)
PROCALCITONIN SERPL-MCNC: <0.05 NG/ML
RBC # BLD AUTO: 4.6 M/UL (ref 3.8–5.2)
RBC MORPH BLD: ABNORMAL
SODIUM SERPL-SCNC: 143 MMOL/L (ref 136–145)
WBC # BLD AUTO: 26.4 K/UL (ref 3.6–11)

## 2019-12-06 PROCEDURE — 85025 COMPLETE CBC W/AUTO DIFF WBC: CPT

## 2019-12-06 PROCEDURE — 74011250636 HC RX REV CODE- 250/636: Performed by: INTERNAL MEDICINE

## 2019-12-06 PROCEDURE — 74011250636 HC RX REV CODE- 250/636: Performed by: HOSPITALIST

## 2019-12-06 PROCEDURE — 94640 AIRWAY INHALATION TREATMENT: CPT

## 2019-12-06 PROCEDURE — 96376 TX/PRO/DX INJ SAME DRUG ADON: CPT

## 2019-12-06 PROCEDURE — 97116 GAIT TRAINING THERAPY: CPT

## 2019-12-06 PROCEDURE — 74011000250 HC RX REV CODE- 250: Performed by: HOSPITALIST

## 2019-12-06 PROCEDURE — 80048 BASIC METABOLIC PNL TOTAL CA: CPT

## 2019-12-06 PROCEDURE — 84145 PROCALCITONIN (PCT): CPT

## 2019-12-06 PROCEDURE — 94760 N-INVAS EAR/PLS OXIMETRY 1: CPT

## 2019-12-06 PROCEDURE — 65270000029 HC RM PRIVATE

## 2019-12-06 PROCEDURE — 74011250637 HC RX REV CODE- 250/637: Performed by: INTERNAL MEDICINE

## 2019-12-06 PROCEDURE — 36415 COLL VENOUS BLD VENIPUNCTURE: CPT

## 2019-12-06 PROCEDURE — 99218 HC RM OBSERVATION: CPT

## 2019-12-06 PROCEDURE — 74011250637 HC RX REV CODE- 250/637: Performed by: NURSE PRACTITIONER

## 2019-12-06 PROCEDURE — 74011250637 HC RX REV CODE- 250/637: Performed by: HOSPITALIST

## 2019-12-06 PROCEDURE — 97161 PT EVAL LOW COMPLEX 20 MIN: CPT

## 2019-12-06 RX ORDER — TRAMADOL HYDROCHLORIDE 50 MG/1
50 TABLET ORAL
Status: DISCONTINUED | OUTPATIENT
Start: 2019-12-06 | End: 2019-12-08 | Stop reason: HOSPADM

## 2019-12-06 RX ORDER — BENZONATATE 100 MG/1
100 CAPSULE ORAL 3 TIMES DAILY
Status: DISCONTINUED | OUTPATIENT
Start: 2019-12-06 | End: 2019-12-06

## 2019-12-06 RX ADMIN — ACETAMINOPHEN 650 MG: 325 TABLET ORAL at 10:19

## 2019-12-06 RX ADMIN — Medication 10 ML: at 06:25

## 2019-12-06 RX ADMIN — METHYLPREDNISOLONE SODIUM SUCCINATE 40 MG: 40 INJECTION, POWDER, FOR SOLUTION INTRAMUSCULAR; INTRAVENOUS at 06:24

## 2019-12-06 RX ADMIN — ENOXAPARIN SODIUM 30 MG: 30 INJECTION SUBCUTANEOUS at 21:12

## 2019-12-06 RX ADMIN — IPRATROPIUM BROMIDE AND ALBUTEROL SULFATE 3 ML: .5; 3 SOLUTION RESPIRATORY (INHALATION) at 10:19

## 2019-12-06 RX ADMIN — METHYLPREDNISOLONE SODIUM SUCCINATE 40 MG: 40 INJECTION, POWDER, FOR SOLUTION INTRAMUSCULAR; INTRAVENOUS at 13:34

## 2019-12-06 RX ADMIN — BENZONATATE 200 MG: 100 CAPSULE ORAL at 17:36

## 2019-12-06 RX ADMIN — ACETAMINOPHEN 650 MG: 325 TABLET ORAL at 03:35

## 2019-12-06 RX ADMIN — DULOXETINE HYDROCHLORIDE 30 MG: 30 CAPSULE, DELAYED RELEASE ORAL at 08:35

## 2019-12-06 RX ADMIN — GUAIFENESIN AND DEXTROMETHORPHAN 10 ML: 100; 10 SYRUP ORAL at 21:13

## 2019-12-06 RX ADMIN — TRAMADOL HYDROCHLORIDE 50 MG: 50 TABLET, FILM COATED ORAL at 18:31

## 2019-12-06 RX ADMIN — GUAIFENESIN 600 MG: 600 TABLET, EXTENDED RELEASE ORAL at 21:13

## 2019-12-06 RX ADMIN — DONEPEZIL HYDROCHLORIDE 10 MG: 5 TABLET, FILM COATED ORAL at 21:13

## 2019-12-06 RX ADMIN — MELATONIN 9 MG: at 21:13

## 2019-12-06 RX ADMIN — GABAPENTIN 800 MG: 100 CAPSULE ORAL at 21:13

## 2019-12-06 RX ADMIN — GUAIFENESIN AND DEXTROMETHORPHAN 10 ML: 100; 10 SYRUP ORAL at 03:27

## 2019-12-06 RX ADMIN — FLUTICASONE FUROATE AND VILANTEROL TRIFENATATE 1 PUFF: 100; 25 POWDER RESPIRATORY (INHALATION) at 08:36

## 2019-12-06 RX ADMIN — GUAIFENESIN AND DEXTROMETHORPHAN 10 ML: 100; 10 SYRUP ORAL at 10:19

## 2019-12-06 RX ADMIN — BENZONATATE 200 MG: 100 CAPSULE ORAL at 21:13

## 2019-12-06 RX ADMIN — BENZONATATE 200 MG: 100 CAPSULE ORAL at 08:35

## 2019-12-06 RX ADMIN — IPRATROPIUM BROMIDE AND ALBUTEROL SULFATE 3 ML: .5; 3 SOLUTION RESPIRATORY (INHALATION) at 03:54

## 2019-12-06 RX ADMIN — GUAIFENESIN 600 MG: 600 TABLET, EXTENDED RELEASE ORAL at 08:35

## 2019-12-06 RX ADMIN — MEMANTINE HYDROCHLORIDE 10 MG: 10 TABLET ORAL at 17:36

## 2019-12-06 RX ADMIN — METHYLPREDNISOLONE SODIUM SUCCINATE 40 MG: 40 INJECTION, POWDER, FOR SOLUTION INTRAMUSCULAR; INTRAVENOUS at 21:13

## 2019-12-06 RX ADMIN — AMLODIPINE BESYLATE 10 MG: 5 TABLET ORAL at 08:35

## 2019-12-06 RX ADMIN — Medication 10 ML: at 13:35

## 2019-12-06 RX ADMIN — TRAMADOL HYDROCHLORIDE 50 MG: 50 TABLET, FILM COATED ORAL at 12:33

## 2019-12-06 RX ADMIN — GABAPENTIN 400 MG: 100 CAPSULE ORAL at 08:34

## 2019-12-06 RX ADMIN — MEMANTINE HYDROCHLORIDE 10 MG: 10 TABLET ORAL at 08:35

## 2019-12-06 RX ADMIN — TRAZODONE HYDROCHLORIDE 100 MG: 100 TABLET ORAL at 21:13

## 2019-12-06 NOTE — PROGRESS NOTES
Bedside and Verbal shift change report given to Reyna Ballesteros (oncoming nurse) by Yonatan Hooker RN (offgoing nurse). Report included the following information SBAR, Kardex, MAR and Recent Results.

## 2019-12-06 NOTE — PROGRESS NOTES
Hospitalist Progress Note    NAME: Jeff Carbajal   :  1945   MRN:  [id number]       Assessment / Plan:  Exertional shortness of breath and cough  Secondary COPD with acute exacerbation   due to Acute bronchitis, POA  Continuous Tobacco abuse  - maintaining SpO2 ~94% on RA. She is very congested, takes break during conversation. Apparently symptoms has been on going for 3 weeks. -CXR neg for PNA  - procalcitonin <0.5  -continue with IV steroids, PRN duo nebs  - complete total 5 days of ABX to treat bronchitis  -Resp panel (-)  -continue with breo-ellipta & mucinex   -outpt pulm referral  -counseled extensively regarding tobacco use  -nicotine patch offered  -leukocytosis secondary to IV steroid     PREMA vs CKD3?  -no previous labs. Cr at 1.5 on admission, creat 1.4 now  -avoid nephrotoxic agents. -encourage PO intake, start IVF x12 hrs  -repeat bmp in the AM    Hypertension POA  -continue amlodipine  -add IV hydralazine prn     Parkinson's disease per pt's PCP  -not on treatment or seen a neurologist yet. Needs referral at discharge     Dementia  -diagnosed by PCP in [place]. She was AAOx4 during my visit.  -cont namenda and Aricept     Chronic pain  - pt asked for Vicodin, claims that she has been on it in the past for chronic pain.  done; no opiates for the past 2 yrs. May take ultram PRN for pain related from deep cough. Pt has been informed that we will not provide RX upon discharge           Code Status: full   Surrogate Decision Maker: NOK sister, Mis Troy, has son who is sick and in hospital   DVT Prophylaxis: lovenox  GI Prophylaxis: not indicated   Baseline: lives with sister  Overweight / Body mass index is 32.32 kg/m². Subjective:     Chief Complaint / Reason for Physician Visit  \"I would like to have some vicodin for my pain. \"  Discussed with RN events overnight.      Review of Systems:  Symptom Y/N Comments  Symptom Y/N Comments   Fever/Chills n   Chest Pain n    Poor Appetite    Edema     Cough y   Abdominal Pain     Sputum n   Joint Pain     SOB/MONTANO y   Pruritis/Rash     Nausea/vomit n   Tolerating PT/OT     Diarrhea    Tolerating Diet     Constipation    Other       Could NOT obtain due to:      Objective:     VITALS:   Last 24hrs VS reviewed since prior progress note. Most recent are:  Patient Vitals for the past 24 hrs:   Temp Pulse Resp BP SpO2   12/06/19 0354     93 %   12/06/19 0329 98.1 °F (36.7 °C) 70 20 138/70 96 %   12/05/19 2007 97.7 °F (36.5 °C) 61 20 120/59 95 %   12/05/19 1549 98.6 °F (37 °C) 64 18 119/56 94 %   12/05/19 0927  77 20 119/80 94 %   12/05/19 0733 97.7 °F (36.5 °C) 72 20 185/85 95 %       Intake/Output Summary (Last 24 hours) at 12/6/2019 0650  Last data filed at 12/6/2019 0552  Gross per 24 hour   Intake 1331.25 ml   Output    Net 1331.25 ml        PHYSICAL EXAM:  General: Ill appearing. Alert, cooperative, no acute distress    EENT:  EOMI. Anicteric sclerae. MMM  Resp:  Bilateral exp wheezing. No accessory muscle use  CV:  Regular  rhythm,  No edema  GI:  Soft, Non distended, Non tender.  +Bowel sounds  Neurologic:  Alert and oriented X 3, normal speech  Psych:   Not anxious nor agitated  Skin:  No rashes. No jaundice    Reviewed most current lab test results and cultures  YES  Reviewed most current radiology test results   YES  Review and summation of old records today    NO  Reviewed patient's current orders and MAR    YES  PMH/ reviewed - no change compared to H&P  ________________________________________________________________________  Care Plan discussed with:    Comments   Patient y    Family      RN y    Care Manager     Consultant                        Multidiciplinary team rounds were held today with , nursing, pharmacist and clinical coordinator. Patient's plan of care was discussed; medications were reviewed and discharge planning was addressed. ________________________________________________________________________  ____________________________________________________________________  Lisa Gammons, NP     Procedures: see electronic medical records for all procedures/Xrays and details which were not copied into this note but were reviewed prior to creation of Plan. LABS:  I reviewed today's most current labs and imaging studies.   Pertinent labs include:  Recent Labs     12/06/19 0351 12/04/19  1624   WBC 26.4* 14.3*   HGB 13.7 14.7   HCT 42.2 44.6    349     Recent Labs     12/06/19 0351 12/05/19  0527 12/04/19  1624    144 142   K 4.5 4.3 3.9   * 117* 112*   CO2 20* 17* 22   * 151* 91   BUN 24* 24* 22*   CREA 1.43* 1.55* 1.49*   CA 8.4* 9.0 9.1   ALB  --   --  3.9   TBILI  --   --  0.2   SGOT  --   --  20   ALT  --   --  26       Signed: Isidoro Portillo NP

## 2019-12-06 NOTE — PROGRESS NOTES
PHYSICAL THERAPY EVALUATION/DISCHARGE  Patient: Bonita Dacosta (89 y.o. female)  Date: 12/6/2019  Primary Diagnosis: COPD exacerbation ([address] [J44.1]       Precautions:          ASSESSMENT  Based on the objective data described below, the patient presents with good strength, intact balance, and baseline independent functional mobility. Gait steady and stable with and without support of rolling walker with pt requiring supervision only as she ambulated 100ft x2. No LOB/balance deficits noted. Given history of falls at home, recommend [facility] PT for home safety evaluation however pt currently declining need. Functional Outcome Measure: The patient scored 85/100 on the Barthel Index outcome measure which is indicative of mild impairment in ADLs and functional mobility. Other factors to consider for discharge: caregiver for sister     Further skilled acute physical therapy is not indicated at this time. PLAN :  Recommendation for discharge: (in order for the patient to meet his/her long term goals)  HHPT for home safety evaluation however pt declining need     This discharge recommendation:  Has been made in collaboration with the attending provider and/or case management    IF patient discharges home will need the following DME: patient owns DME required for discharge       SUBJECTIVE:   Patient stated I'll be fine once this cough goes away.     OBJECTIVE DATA SUMMARY:   HISTORY:    No past medical history on file. No past surgical history on file. Prior level of function: Pt states she used to ambulate with use of RW however stopped using RW x3 months ago when she moved in with sister. Pt states she helps sister \"with everything\" and cannot use RW because she is helping her sister (does not provide physical assist however does all cooking, cleaning) Reports x3-4 falls over previous 6 months. Does not drive.    Personal factors and/or comorbidities impacting plan of care:     Home Situation  Home Environment: Private residence  # Steps to Enter: 5  Rails to Enter: Yes  [employer] : Right  One/Two Story Residence: One story  Living Alone: No  Support Systems: Family member(s)(sister )  Patient Expects to be Discharged to[de-identified] Private residence  Current DME Used/Available at Home: Walker, rolling, [address] chair, Grab bars  Tub or Shower Type: Tub/Shower combination    EXAMINATION/PRESENTATION/DECISION MAKING:   Critical Behavior:  Neurologic State: Alert  Orientation Level: Oriented X4  Cognition: Appropriate safety awareness     Hearing: Auditory  Auditory Impairment: Hearing aid(s)  Hearing Aids/Status: At bedside, Left  Skin:  intact  Edema: none noted   Range Of Motion:  AROM: Within functional limits                       Strength:    Strength: Within functional limits                    Tone & Sensation:   Tone: Normal                              Coordination:  Coordination: Within functional limits  Vision:      Functional Mobility:  Bed Mobility:  Rolling: Independent  Supine to Sit: Independent     Scooting: Independent  Transfers:  Sit to Stand: Independent  Stand to Sit: Independent        Bed to Chair: Supervision              Balance:   Sitting: Intact  Standing: Intact; With support(intact with and without support of rolling walker)  Ambulation/Gait Training:  Distance (ft): 200 Feet (ft)(100ft w/ RW at supervision, 100ft w/o RW at supervision)  Assistive Device: Gait belt;Walker, rolling(100ft w/ RW, 100ft without AD)  Ambulation - Level of Assistance: Supervision        Gait Abnormalities: Decreased step clearance; Antalgic        Base of Support: Widened     Speed/Kandace: Pace decreased (<100 feet/min)                   Functional Measure:  Barthel Index:    Bathin  Bladder: 10  Bowels: 10  Groomin  Dressing: 10  Feeding: 10  Mobility: 10  Stairs: 0  Toilet Use: 10  Transfer (Bed to Chair and Back): 15  Total: 85/100       The Barthel ADL Index: Guidelines  1.  The index should be used as a record of what a patient does, not as a record of what a patient could do. 2. The main aim is to establish degree of independence from any help, physical or verbal, however minor and for whatever reason. 3. The need for supervision renders the patient not independent. 4. A patient's performance should be established using the best available evidence. Asking the patient, friends/relatives and nurses are the usual sources, but direct observation and common sense are also important. However direct testing is not needed. 5. Usually the patient's performance over the preceding 24-48 hours is important, but occasionally longer periods will be relevant. 6. Middle categories imply that the patient supplies over 50 per cent of the effort. 7. Use of aids to be independent is allowed. Katia Douglas., Barthel, D.W. (6678). Functional evaluation: the Barthel Index. [facility] (14)2. ELIZABETH Campos, Vinny Miller., Adrian Guevara., [place], [facility] (1999). Measuring the change indisability after inpatient rehabilitation; comparison of the responsiveness of the Barthel Index and Functional Cass Measure. Journal of Neurology, Neurosurgery, and Psychiatry, 66(4), 460-255. Ilda Green, N.J.A, TALISHA Paul.KODY, & Erin Harley M.A. (2004.) Assessment of post-stroke quality of life in cost-effectiveness studies: The usefulness of the Barthel Index and the EuroQoL-5D.  Quality of Life Research, 15, 107-40          Physical Therapy Evaluation Charge Determination   History Examination Presentation Decision-Making   MEDIUM  Complexity : 1-2 comorbidities / personal factors will impact the outcome/ POC  HIGH Complexity : 4+ Standardized tests and measures addressing body structure, function, activity limitation and / or participation in recreation  MEDIUM Complexity : Evolving with changing characteristics  MEDIUM Complexity : FOTO score of 26-74      Based on the above components, the patient evaluation is determined to be of the following complexity level: MEDIUM    Pain Rating:  Denied complaints of pain    Activity Tolerance: WNL  Please refer to the flowsheet for vital signs taken during this treatment. After treatment patient left in no apparent distress:   Sitting in chair and Call bell within reach    COMMUNICATION/EDUCATION:   The patients plan of care was discussed with: Registered Nurse. Fall prevention education was provided and the patient/caregiver indicated understanding., Patient/family have participated as able in goal setting and plan of care. and Patient/family agree to work toward stated goals and plan of care.     Thank you for this referral.  Yarelis Maria, PT, DPT   Time Calculation: 16 mins

## 2019-12-06 NOTE — PROGRESS NOTES
Bedside shift change report given to [id number]att (oncoming nurse) by Lencho Dos Santos RN (offgoing nurse). Report included the following information SBAR, Kardex, ED Summary, Intake/Output, MAR and Recent Results.

## 2019-12-07 LAB
ANION GAP SERPL CALC-SCNC: 4 MMOL/L (ref 5–15)
BASOPHILS # BLD: 0 K/UL (ref 0–0.1)
BASOPHILS NFR BLD: 0 % (ref 0–1)
BUN SERPL-MCNC: 28 MG/DL (ref 6–20)
BUN/CREAT SERPL: 21 (ref 12–20)
CALCIUM SERPL-MCNC: 8.8 MG/DL (ref 8.5–10.1)
CHLORIDE SERPL-SCNC: 114 MMOL/L (ref 97–108)
CO2 SERPL-SCNC: 23 MMOL/L (ref 21–32)
CREAT SERPL-MCNC: 1.34 MG/DL (ref 0.55–1.02)
DIFFERENTIAL METHOD BLD: ABNORMAL
EOSINOPHIL # BLD: 0 K/UL (ref 0–0.4)
EOSINOPHIL NFR BLD: 0 % (ref 0–7)
ERYTHROCYTE [DISTWIDTH] IN BLOOD BY AUTOMATED COUNT: 15.3 % (ref 11.5–14.5)
GLUCOSE SERPL-MCNC: 167 MG/DL (ref 65–100)
HCT VFR BLD AUTO: 41.3 % (ref 35–47)
HGB BLD-MCNC: 13.1 G/DL (ref 11.5–16)
IMM GRANULOCYTES # BLD AUTO: 0.7 K/UL (ref 0–0.04)
IMM GRANULOCYTES NFR BLD AUTO: 3 % (ref 0–0.5)
LYMPHOCYTES # BLD: 1.6 K/UL (ref 0.8–3.5)
LYMPHOCYTES NFR BLD: 7 % (ref 12–49)
MCH RBC QN AUTO: 29.6 PG (ref 26–34)
MCHC RBC AUTO-ENTMCNC: 31.7 G/DL (ref 30–36.5)
MCV RBC AUTO: 93.2 FL (ref 80–99)
MONOCYTES # BLD: 1.2 K/UL (ref 0–1)
MONOCYTES NFR BLD: 5 % (ref 5–13)
NEUTS SEG # BLD: 19.9 K/UL (ref 1.8–8)
NEUTS SEG NFR BLD: 85 % (ref 32–75)
NRBC # BLD: 0 K/UL (ref 0–0.01)
NRBC BLD-RTO: 0 PER 100 WBC
PLATELET # BLD AUTO: 325 K/UL (ref 150–400)
PMV BLD AUTO: 9.1 FL (ref 8.9–12.9)
POTASSIUM SERPL-SCNC: 4.6 MMOL/L (ref 3.5–5.1)
RBC # BLD AUTO: 4.43 M/UL (ref 3.8–5.2)
RBC MORPH BLD: ABNORMAL
SODIUM SERPL-SCNC: 141 MMOL/L (ref 136–145)
WBC # BLD AUTO: 23.4 K/UL (ref 3.6–11)

## 2019-12-07 PROCEDURE — 74011250636 HC RX REV CODE- 250/636: Performed by: INTERNAL MEDICINE

## 2019-12-07 PROCEDURE — 74011000250 HC RX REV CODE- 250: Performed by: NURSE PRACTITIONER

## 2019-12-07 PROCEDURE — 74011250637 HC RX REV CODE- 250/637: Performed by: NURSE PRACTITIONER

## 2019-12-07 PROCEDURE — 77030018744 HC FLOMTR PEAK FLO -A

## 2019-12-07 PROCEDURE — 74011250636 HC RX REV CODE- 250/636: Performed by: HOSPITALIST

## 2019-12-07 PROCEDURE — 74011250637 HC RX REV CODE- 250/637: Performed by: HOSPITALIST

## 2019-12-07 PROCEDURE — 80048 BASIC METABOLIC PNL TOTAL CA: CPT

## 2019-12-07 PROCEDURE — 36415 COLL VENOUS BLD VENIPUNCTURE: CPT

## 2019-12-07 PROCEDURE — 85025 COMPLETE CBC W/AUTO DIFF WBC: CPT

## 2019-12-07 PROCEDURE — 74011250637 HC RX REV CODE- 250/637: Performed by: INTERNAL MEDICINE

## 2019-12-07 PROCEDURE — 94150 VITAL CAPACITY TEST: CPT

## 2019-12-07 PROCEDURE — 65270000029 HC RM PRIVATE

## 2019-12-07 PROCEDURE — 94640 AIRWAY INHALATION TREATMENT: CPT

## 2019-12-07 RX ORDER — BUDESONIDE 0.5 MG/2ML
500 INHALANT ORAL
Status: DISCONTINUED | OUTPATIENT
Start: 2019-12-07 | End: 2019-12-08 | Stop reason: HOSPADM

## 2019-12-07 RX ORDER — IPRATROPIUM BROMIDE AND ALBUTEROL SULFATE 2.5; .5 MG/3ML; MG/3ML
3 SOLUTION RESPIRATORY (INHALATION)
Status: DISCONTINUED | OUTPATIENT
Start: 2019-12-07 | End: 2019-12-08 | Stop reason: HOSPADM

## 2019-12-07 RX ADMIN — BENZONATATE 200 MG: 100 CAPSULE ORAL at 22:10

## 2019-12-07 RX ADMIN — GABAPENTIN 400 MG: 100 CAPSULE ORAL at 08:33

## 2019-12-07 RX ADMIN — IPRATROPIUM BROMIDE AND ALBUTEROL SULFATE 3 ML: .5; 3 SOLUTION RESPIRATORY (INHALATION) at 08:01

## 2019-12-07 RX ADMIN — DONEPEZIL HYDROCHLORIDE 10 MG: 5 TABLET, FILM COATED ORAL at 22:11

## 2019-12-07 RX ADMIN — Medication 10 ML: at 14:42

## 2019-12-07 RX ADMIN — METHYLPREDNISOLONE SODIUM SUCCINATE 40 MG: 40 INJECTION, POWDER, FOR SOLUTION INTRAMUSCULAR; INTRAVENOUS at 04:50

## 2019-12-07 RX ADMIN — GUAIFENESIN AND DEXTROMETHORPHAN 10 ML: 100; 10 SYRUP ORAL at 04:50

## 2019-12-07 RX ADMIN — AMLODIPINE BESYLATE 10 MG: 5 TABLET ORAL at 08:34

## 2019-12-07 RX ADMIN — BENZONATATE 200 MG: 100 CAPSULE ORAL at 08:33

## 2019-12-07 RX ADMIN — TRAMADOL HYDROCHLORIDE 50 MG: 50 TABLET, FILM COATED ORAL at 08:34

## 2019-12-07 RX ADMIN — AZITHROMYCIN MONOHYDRATE 500 MG: 500 INJECTION, POWDER, LYOPHILIZED, FOR SOLUTION INTRAVENOUS at 22:07

## 2019-12-07 RX ADMIN — TRAMADOL HYDROCHLORIDE 50 MG: 50 TABLET, FILM COATED ORAL at 14:39

## 2019-12-07 RX ADMIN — TRAZODONE HYDROCHLORIDE 100 MG: 100 TABLET ORAL at 22:10

## 2019-12-07 RX ADMIN — BUDESONIDE 500 MCG: 0.5 INHALANT RESPIRATORY (INHALATION) at 19:59

## 2019-12-07 RX ADMIN — GABAPENTIN 800 MG: 100 CAPSULE ORAL at 22:09

## 2019-12-07 RX ADMIN — TRAMADOL HYDROCHLORIDE 50 MG: 50 TABLET, FILM COATED ORAL at 20:43

## 2019-12-07 RX ADMIN — MELATONIN 9 MG: at 22:09

## 2019-12-07 RX ADMIN — FLUTICASONE FUROATE AND VILANTEROL TRIFENATATE 1 PUFF: 100; 25 POWDER RESPIRATORY (INHALATION) at 08:35

## 2019-12-07 RX ADMIN — GUAIFENESIN 600 MG: 600 TABLET, EXTENDED RELEASE ORAL at 20:43

## 2019-12-07 RX ADMIN — TRAMADOL HYDROCHLORIDE 50 MG: 50 TABLET, FILM COATED ORAL at 01:50

## 2019-12-07 RX ADMIN — BENZONATATE 200 MG: 100 CAPSULE ORAL at 17:01

## 2019-12-07 RX ADMIN — MEMANTINE HYDROCHLORIDE 10 MG: 10 TABLET ORAL at 08:33

## 2019-12-07 RX ADMIN — MEMANTINE HYDROCHLORIDE 10 MG: 10 TABLET ORAL at 17:01

## 2019-12-07 RX ADMIN — GUAIFENESIN 600 MG: 600 TABLET, EXTENDED RELEASE ORAL at 08:34

## 2019-12-07 RX ADMIN — DULOXETINE HYDROCHLORIDE 30 MG: 30 CAPSULE, DELAYED RELEASE ORAL at 08:33

## 2019-12-07 RX ADMIN — Medication 10 ML: at 22:08

## 2019-12-07 RX ADMIN — AZITHROMYCIN MONOHYDRATE 500 MG: 500 INJECTION, POWDER, LYOPHILIZED, FOR SOLUTION INTRAVENOUS at 01:47

## 2019-12-07 RX ADMIN — IPRATROPIUM BROMIDE AND ALBUTEROL SULFATE 3 ML: .5; 3 SOLUTION RESPIRATORY (INHALATION) at 13:58

## 2019-12-07 RX ADMIN — METHYLPREDNISOLONE SODIUM SUCCINATE 40 MG: 40 INJECTION, POWDER, FOR SOLUTION INTRAMUSCULAR; INTRAVENOUS at 14:42

## 2019-12-07 RX ADMIN — BUDESONIDE 500 MCG: 0.5 INHALANT RESPIRATORY (INHALATION) at 08:01

## 2019-12-07 RX ADMIN — METHYLPREDNISOLONE SODIUM SUCCINATE 40 MG: 40 INJECTION, POWDER, FOR SOLUTION INTRAMUSCULAR; INTRAVENOUS at 22:11

## 2019-12-07 RX ADMIN — IPRATROPIUM BROMIDE AND ALBUTEROL SULFATE 3 ML: .5; 3 SOLUTION RESPIRATORY (INHALATION) at 19:59

## 2019-12-07 RX ADMIN — ENOXAPARIN SODIUM 30 MG: 30 INJECTION SUBCUTANEOUS at 20:43

## 2019-12-07 NOTE — PROGRESS NOTES
ADULT PROTOCOL: JET AEROSOL ASSESSMENT    Patient  Cetronia See     66 y.o.   female     12/7/2019  9:45 AM    Breath Sounds Pre Procedure: Right Breath Sounds: Coarse                               Left Breath Sounds: Coarse    Breath Sounds Post Procedure: Right Breath Sounds: Coarse                                 Left Breath Sounds: Coarse    Breathing pattern: Pre procedure Breathing Pattern: Dyspnea at rest          Post procedure Breathing Pattern: Dyspnea at rest    Heart Rate: Pre procedure Pulse: 59           Post procedure Pulse: 80    Resp Rate: Pre procedure Respirations: 26           Post procedure Respirations: 26    Peak Flow 200L/min        Cough: Pre procedure Cough: Non-productive, Congested               Post procedure Cough: Non-productive, Congested      Oxygen: O2 Device: Room air           SpO2: Pre procedure SpO2: 99 %                Post procedure SpO2: 96 %      Nebulizer Therapy: Current medications Aerosolized Medications: DuoNeb, Pulmicort         Problem List:   Patient Active Problem List   Diagnosis Code    [facility] J44.1    COPD (chronic obstructive pulmonary disease) ([facility] J44.9       Respiratory Therapist: RT Gilda

## 2019-12-07 NOTE — PROGRESS NOTES
Problem: Chronic Obstructive Pulmonary Disease (COPD)  Goal: *Oxygen saturation during activity within specified parameters  Outcome: Progressing Towards Goal

## 2019-12-07 NOTE — PROGRESS NOTES
Hospitalist Progress Note    NAME: Ailyn Kimbrough   :  1945   MRN:  [id number]       Assessment / Plan:  Exertional shortness of breath and cough  Secondary COPD with acute exacerbation   due to Acute bronchitis, POA  ActiveTobacco abuse  - RA 96%    CXR (-)    Chest CT:  1. 7mm right upper lobe pulmonary nodule: CT at 6-12 months, if multiple at 3-6 months, then CT at 18-24 months. 2. Very mild right basilar dependent atelectasis    procalcitonin <0.5      Resp panel (-)    continue with IV steroids    complete total 5 days of ABX to treat bronchitis    continue with breo-ellipta & mucinex. Continue with scheduled Pulmicort respules and duoneb along with PRN duoneb    Recommend to follow up with pulmonologist as outpatient    counseled extensively regarding tobacco use; nicotine patch offered    leukocytosis secondary to IV steroid which is trending down     PREMA vs CKD3?  - trending down; Cr 1.3 vs Cr 1.5 on admission. Will continue to monitor   avoid nephrotoxic agents. Hypertension POA  - continue amlodipine    IV hydralazine prn     Parkinson's disease per pt's PCP  - not on treatment or seen a neurologist yet. Needs referral at discharge     Dementia  - diagnosed by PCP in [place]. She was AAOx4 during my visit. cont namenda and Aricept     Chronic pain  - pt asked for Vicodin, claims that she has been on it in the past for chronic pain.  done; no opiates for the past 2 yrs. May take ultram PRN for pain related from deep cough. Pt has been informed that we will not provide RX upon discharge           Code Status: full   Surrogate Decision Maker: NOK sister, Enma Peterson, has son who is sick and in hospital   DVT Prophylaxis: lovenox  GI Prophylaxis: not indicated   Baseline: lives with sister  Overweight / Body mass index is 32.32 kg/m².        Subjective:     Chief Complaint / Reason for Physician Visit  \"I am still short a breath but it seems like I am breathing better today than yesterday. \"  Discussed with RN events overnight. Review of Systems:  Symptom Y/N Comments  Symptom Y/N Comments   Fever/Chills n   Chest Pain n    Poor Appetite    Edema     Cough y   Abdominal Pain     Sputum n   Joint Pain     SOB/MONTANO y   Pruritis/Rash     Nausea/vomit n   Tolerating PT/OT     Diarrhea    Tolerating Diet     Constipation    Other       Could NOT obtain due to:      Objective:     VITALS:   Last 24hrs VS reviewed since prior progress note. Most recent are:  Patient Vitals for the past 24 hrs:   Temp Pulse Resp BP SpO2   12/07/19 0449 98.2 °F (36.8 °C) 61 17 137/86 96 %   12/06/19 2001 98.4 °F (36.9 °C) 64 18 128/57 95 %   12/06/19 1624 98.1 °F (36.7 °C) 70 20 141/73 94 %   12/06/19 1019     93 %       Intake/Output Summary (Last 24 hours) at 12/7/2019 0751  Last data filed at 12/6/2019 2351  Gross per 24 hour   Intake 970 ml   Output    Net 970 ml        PHYSICAL EXAM:  General: Ill appearing. Alert, cooperative, no acute distress    EENT:  EOMI. Anicteric sclerae. MMM  Resp:  Bilateral exp wheezing. No accessory muscle use  CV:  Regular  rhythm,  No edema  GI:  Soft, Non distended, Non tender.  +Bowel sounds  Neurologic:  Alert and oriented X 3, normal speech  Psych:   Not anxious nor agitated  Skin:  No rashes. No jaundice    Reviewed most current lab test results and cultures  YES  Reviewed most current radiology test results   YES  Review and summation of old records today    NO  Reviewed patient's current orders and MAR    YES  PMH/ reviewed - no change compared to H&P  ________________________________________________________________________  Care Plan discussed with:    Comments   Patient y    Family      RN y    Care Manager     Consultant                        Multidiciplinary team rounds were held today with , nursing, pharmacist and clinical coordinator. Patient's plan of care was discussed; medications were reviewed and discharge planning was addressed. ________________________________________________________________________  ____________________________________________________________________  Hardy Haley NP     Procedures: see electronic medical records for all procedures/Xrays and details which were not copied into this note but were reviewed prior to creation of Plan. LABS:  I reviewed today's most current labs and imaging studies.   Pertinent labs include:  Recent Labs     12/07/19  0151 12/06/19  0351 12/04/19  1624   WBC 23.4* 26.4* 14.3*   HGB 13.1 13.7 14.7   HCT 41.3 42.2 44.6    329 349     Recent Labs     12/07/19  0151 12/06/19  0351 12/05/19  0527 12/04/19  1624    143 144 142   K 4.6 4.5 4.3 3.9   * 115* 117* 112*   CO2 23 20* 17* 22   * 158* 151* 91   BUN 28* 24* 24* 22*   CREA 1.34* 1.43* 1.55* 1.49*   CA 8.8 8.4* 9.0 9.1   ALB  --   --   --  3.9   TBILI  --   --   --  0.2   SGOT  --   --   --  20   ALT  --   --   --  26       Signed: Isidoro Portillo NP

## 2019-12-08 VITALS
TEMPERATURE: 97.9 F | OXYGEN SATURATION: 99 % | HEART RATE: 95 BPM | BODY MASS INDEX: 32.3 KG/M2 | DIASTOLIC BLOOD PRESSURE: 74 MMHG | HEIGHT: 61 IN | RESPIRATION RATE: 18 BRPM | WEIGHT: 171.08 LBS | SYSTOLIC BLOOD PRESSURE: 155 MMHG

## 2019-12-08 PROBLEM — J40 BRONCHITIS: Status: ACTIVE | Noted: 2019-12-08

## 2019-12-08 PROBLEM — E66.9 OBESITY: Status: ACTIVE | Noted: 2019-12-08

## 2019-12-08 PROBLEM — I10 HYPERTENSION: Status: ACTIVE | Noted: 2019-12-08

## 2019-12-08 PROBLEM — Z72.0 TOBACCO ABUSE: Status: ACTIVE | Noted: 2019-12-08

## 2019-12-08 PROBLEM — R91.1 PULMONARY NODULE: Status: ACTIVE | Noted: 2019-12-08

## 2019-12-08 PROCEDURE — 74011000250 HC RX REV CODE- 250: Performed by: NURSE PRACTITIONER

## 2019-12-08 PROCEDURE — 94640 AIRWAY INHALATION TREATMENT: CPT

## 2019-12-08 PROCEDURE — 74011250637 HC RX REV CODE- 250/637: Performed by: INTERNAL MEDICINE

## 2019-12-08 PROCEDURE — 90686 IIV4 VACC NO PRSV 0.5 ML IM: CPT | Performed by: HOSPITALIST

## 2019-12-08 PROCEDURE — 74011250637 HC RX REV CODE- 250/637: Performed by: NURSE PRACTITIONER

## 2019-12-08 PROCEDURE — 74011250637 HC RX REV CODE- 250/637: Performed by: HOSPITALIST

## 2019-12-08 PROCEDURE — 74011250636 HC RX REV CODE- 250/636: Performed by: HOSPITALIST

## 2019-12-08 PROCEDURE — 90471 IMMUNIZATION ADMIN: CPT

## 2019-12-08 RX ORDER — BENZONATATE 200 MG/1
200 CAPSULE ORAL
Qty: 15 CAP | Refills: 0 | Status: SHIPPED | OUTPATIENT
Start: 2019-12-08 | End: 2019-12-15

## 2019-12-08 RX ORDER — GUAIFENESIN/DEXTROMETHORPHAN 100-10MG/5
10 SYRUP ORAL
Qty: 100 ML | Refills: 0 | Status: SHIPPED | OUTPATIENT
Start: 2019-12-08 | End: 2019-12-18

## 2019-12-08 RX ORDER — AMLODIPINE BESYLATE 10 MG/1
10 TABLET ORAL DAILY
Qty: 30 TAB | Refills: 0 | Status: SHIPPED | OUTPATIENT
Start: 2019-12-08

## 2019-12-08 RX ORDER — FLUTICASONE FUROATE AND VILANTEROL 100; 25 UG/1; UG/1
1 POWDER RESPIRATORY (INHALATION) DAILY
Qty: 1 INHALER | Refills: 0 | Status: SHIPPED | OUTPATIENT
Start: 2019-12-08

## 2019-12-08 RX ORDER — GUAIFENESIN 600 MG/1
600 TABLET, EXTENDED RELEASE ORAL EVERY 12 HOURS
Qty: 14 TAB | Refills: 0 | Status: SHIPPED | OUTPATIENT
Start: 2019-12-08

## 2019-12-08 RX ADMIN — GABAPENTIN 400 MG: 100 CAPSULE ORAL at 08:21

## 2019-12-08 RX ADMIN — BUDESONIDE 500 MCG: 0.5 INHALANT RESPIRATORY (INHALATION) at 08:13

## 2019-12-08 RX ADMIN — IPRATROPIUM BROMIDE AND ALBUTEROL SULFATE 3 ML: .5; 3 SOLUTION RESPIRATORY (INHALATION) at 01:59

## 2019-12-08 RX ADMIN — TRAMADOL HYDROCHLORIDE 50 MG: 50 TABLET, FILM COATED ORAL at 02:36

## 2019-12-08 RX ADMIN — AMLODIPINE BESYLATE 10 MG: 5 TABLET ORAL at 08:21

## 2019-12-08 RX ADMIN — METHYLPREDNISOLONE SODIUM SUCCINATE 40 MG: 40 INJECTION, POWDER, FOR SOLUTION INTRAMUSCULAR; INTRAVENOUS at 06:43

## 2019-12-08 RX ADMIN — MEMANTINE HYDROCHLORIDE 10 MG: 10 TABLET ORAL at 08:22

## 2019-12-08 RX ADMIN — Medication 10 ML: at 06:43

## 2019-12-08 RX ADMIN — FLUTICASONE FUROATE AND VILANTEROL TRIFENATATE 1 PUFF: 100; 25 POWDER RESPIRATORY (INHALATION) at 08:24

## 2019-12-08 RX ADMIN — IPRATROPIUM BROMIDE AND ALBUTEROL SULFATE 3 ML: .5; 3 SOLUTION RESPIRATORY (INHALATION) at 08:13

## 2019-12-08 RX ADMIN — DULOXETINE HYDROCHLORIDE 30 MG: 30 CAPSULE, DELAYED RELEASE ORAL at 08:22

## 2019-12-08 RX ADMIN — BENZONATATE 200 MG: 100 CAPSULE ORAL at 08:21

## 2019-12-08 RX ADMIN — GUAIFENESIN 600 MG: 600 TABLET, EXTENDED RELEASE ORAL at 08:21

## 2019-12-08 RX ADMIN — INFLUENZA VIRUS VACCINE 0.5 ML: 15; 15; 15; 15 SUSPENSION INTRAMUSCULAR at 10:13

## 2019-12-08 RX ADMIN — TRAMADOL HYDROCHLORIDE 50 MG: 50 TABLET, FILM COATED ORAL at 08:21

## 2019-12-08 NOTE — PROGRESS NOTES
Reason for Admission:   COPD Exacerbation                  RRAT Score:    16               Do you (patient/family) have any concerns for transition/discharge? No concerns voiced regarding d/c.                Plan for utilizing home health:  No prior SNF or HH in the past.      Current Advanced Directive/Advance Care Plan:  FULL Code. No POA or Advanced Medical Directive. Son is her legal decision maker. Transition of Care Plan:    Pt resides w/her sister Didier Bonilla in one story home w/five steps to enter the front door. PCP is Dr. Lolis Martinez ([facility]). Pt is independent w/all ADL's, and requires no assistance at this time. DME (Nebulizer & walker). No O2. No prior HH/SNF in the past.  Doesn't drive and sister takes to all medical appointments. Sister to transport at time of d/c. Pt to see Pulmonology & PCP (Dr. Lolis Martinez) after d/c.        Pt qualifies for [facility] d/t admitting dx of COPD. Offered to pt; however pt declined this service. SW to continue to assist.     Care Management Interventions  PCP Verified by CM: Yes(Dr. Lolis Martinez. Last seen on 12/4/2019.)  Mode of Transport at Discharge: Other (see comment)(Sister)  Transition of Care Consult (CM Consult): Discharge Planning  Discharge Durable Medical Equipment: (DME (Nebulizer & walker).   No O2.  )  Current Support Network: Own Home, Relative's Home(Ranch style home w/five steps to enter.)  Confirm Follow Up Transport: Family  Plan discussed with Pt/Family/Caregiver: Yes  Discharge Location  Discharge Placement: (Home)      GEORGE Feliciano

## 2019-12-08 NOTE — DISCHARGE SUMMARY
Hospitalist Discharge Summary     Patient ID:  Kriss Miranda  [id number]  66 y.o.  1945    PCP on record: None    Admit date: 12/4/2019  Discharge date and time: 12/8/2019      DISCHARGE DIAGNOSIS:  Exertional shortness of breath and cough  Secondary COPD with acute exacerbation   due to Acute bronchitis, POA  ActiveTobacco abuse  PREMA vs CKD3  Hypertension POA  Parkinson's disease per pt's PCP  Dementia  Chronic pain   obesity  BMI 32.32 kg/m²         CONSULTATIONS:  IP CONSULT TO HOSPITALIST    Excerpted HPI from H&P of Shantelle Dailey MD:  Kriss Miranda is a 66 y.o. PMHx significant for COPD,  continuous tobacco abuse, dementia, Parkinson's disease presented to ER with above symptoms. Patient states she has been having symptoms for almost 2 months now initially it started with upper respiratory tract symptoms but her cough never improved. She has tried outpatient antibiotics with no improvement. Denies any fever or sore throat. Cough is productive of yellow sputum. States her sister had similar symptoms but her symptoms have since resolved. Patient has never seen a pulmonologist for COPD. She recently moved from [place] to [place] to take care of her sister she does have a primary care physician here. In ER she was noticed to be tachypneic with respiratory rate in 20s but her sats have been in 90s on room air. ER work-up is negative for any acute infection. She received multiple treatment of nebs and IV steroids with minimal improvement. We were asked to admit for work up and evaluation of the above problems. ______________________________________________________________________  DISCHARGE SUMMARY/HOSPITAL COURSE:  for full details see H&P, daily progress notes, labs, consult notes.      Exertional shortness of breath and cough  Secondary COPD with acute exacerbation   due to Acute bronchitis, POA  ActiveTobacco abuse (has no plan to quit anytime soon per pt)  - RA 96% CXR (-)    Chest CT:  1. 7mm right upper lobe pulmonary nodule: CT at 6-12 months, if multiple at 3-6 months, then CT at 18-24 months. 2. Very mild right basilar dependent atelectasis     -> advised pt to follow up with her pcp and pulmonologist as soon as possible. CM has been consulted. procalcitonin <0.5      Resp panel (-)    advised to complete steroid as directed    Pt received 4 IV Zithromax 500mg during this admission to treat bronchitis. No further ABX need. continue with breo-ellipta & mucinex. Continue with PRN duoneb    counseled extensively regarding tobacco use; nicotine patch offered, but pt declined    leukocytosis secondary to IV steroid which is trending down     PREMA vs CKD3?  - trending down; Cr 1.3 vs Cr 1.5 on admission.      Hypertension POA  - continue amlodipin     Parkinson's disease per pt's PCP  - not on treatment or seen a neurologist yet. Pt wants to follow up with her pcp first.     Dementia  - diagnosed by PCP in [place]. She was AAOx4 during my visit. cont namenda and Aricept     Chronic pain  - pt asked for Vicodin, claims that she has been on it in the past for chronic pain.  done; no opiates for the past 2 yrs. ultram PRN for pain was used during this admission. Pt has been informed that we will not provide RX upon discharge        Obesity; discussed about importance of life style modification  BMI 32.32 kg/m²         _______________________________________________________________________  Patient seen and examined by me on discharge day. Pertinent Findings:  Gen:    Not in distress  Chest: Clear in apex with decreased breath sounds at bases. No wheezing  CVS:   Regular rhythm.   No edema  Abd:  Soft, not distended, not tender  Neuro:  Alert, orient x 4  _______________________________________________________________________  DISCHARGE MEDICATIONS:   Discharge Medication List as of 12/8/2019 10:15 AM      START taking these medications    Details benzonatate (TESSALON) 200 mg capsule Take 1 Cap by mouth three (3) times daily as needed for Cough for up to 7 days. , Print, Disp-15 Cap, R-0      guaiFENesin-dextromethorphan (ROBITUSSIN DM) 100-10 mg/5 mL syrup Take 10 mL by mouth every six (6) hours as needed for Cough for up to 10 days. , Print, Disp-100 mL, R-0      fluticasone furoate-vilanterol (BREO ELLIPTA) 100-25 mcg/dose inhaler Take 1 Puff by inhalation daily. , Print, Disp-1 Inhaler, R-0      guaiFENesin ER (MUCINEX) 600 mg ER tablet Take 1 Tab by mouth every twelve (12) hours. , Print, Disp-14 Tab, R-0         CONTINUE these medications which have CHANGED    Details   amLODIPine (NORVASC) 10 mg tablet Take 1 Tab by mouth daily. , Print, Disp-30 Tab, R-0         CONTINUE these medications which have NOT CHANGED    Details   donepezil (ARICEPT) 10 mg tablet Take 10 mg by mouth nightly., Historical Med      DULoxetine (CYMBALTA) 30 mg capsule Take 30 mg by mouth daily. , Historical Med      !! gabapentin (NEURONTIN) 400 mg capsule Take 400 mg by mouth daily. Patient takes 400 mg QAM and 800 mg QHS, Historical Med      !! gabapentin (NEURONTIN) 400 mg capsule Take 800 mg by mouth nightly. Patient takes 400 mg QAM and 800 mg QHS, Historical Med      albuterol-ipratropium (DUO-NEB) 2.5 mg-0.5 mg/3 ml nebu 3 mL by Nebulization route every six (6) hours as needed (SOB). , Historical Med      traZODone (DESYREL) 100 mg tablet Take 100 mg by mouth nightly., Historical Med      melatonin 10 mg tab Take 10 mg by mouth nightly., Historical Med      L-LYSINE PO Take 1 Tab by mouth nightly., Historical Med      vitamin E (AQUA GEMS) 400 unit capsule Take 400 Units by mouth daily. , Historical Med      ergocalciferol (ERGOCALCIFEROL) 50,000 unit capsule Take 50,000 Units by mouth Every Saturday., Historical Med      cyanocobalamin 1,000 mcg tablet Take 1,000 mcg by mouth daily. , Historical Med      memantine (NAMENDA) 10 mg tablet Take 10 mg by mouth two (2) times a day. , Historical Med       !! - Potential duplicate medications found. Please discuss with provider. My Recommended Diet, Activity, Wound Care, and follow-up labs are listed in the patient's Discharge Insturctions which I have personally completed and reviewed. _______________________________________________________________________  DISPOSITION:    Home with Family: y   Home with HH/PT/OT/RN:    SNF/LTC:    DAVID:    OTHER:        Condition at Discharge:  Stable  Offered hospital to home visit, but pt declined  _______________________________________________________________________  Follow up with:   PCP : None  Follow-up Information     Follow up With Specialties Details Why Contact Info    [facility]   please call to make an appoitnement to be seen in one week [place] [address] [address]  [phone]    [facility], In-Home Clinical Assessments  as need Mobile Urgent Care That Comes To [address] [facility]  69 Jez Juarez MD Internal Medicine, Pulmonary Disease  to be seen as soon as possible to evaluate pulmonary nodule [address] [facility]  [address] [place] [address] Rd  [phone]      None    None (814) Patient stated that they have no PCP                Total time in minutes spent coordinating this discharge (includes going over instructions, follow-up, prescriptions, and preparing report for sign off to her PCP) :  45 minutes    Signed:  Isidoro Morrison NP

## 2019-12-08 NOTE — DISCHARGE INSTRUCTIONS
Patient Discharge Instructions     Pt Name  Rich Lara   Date of Birth 1945   Age  66 y.o. Medical Record Number  [id number]   PCP None    Admit date:  12/4/2019 @    [place] [address]    Room Number  4210/11   Date of Discharge 12/8/2019     Admission Diagnoses:     COPD exacerbation ([facility]          Allergies   Allergen Reactions    Bee Venom Protein (Honey Bee) Itching    Codeine Hives    Tomato Hives        You were admitted to [address] for  COPD exacerbation ([facility]    YOUR OTHER MEDICAL DIAGNOSES INCLUDE (BUT NOT LIMITED TO ):  Present on Admission:   COPD exacerbation ([facility]   COPD (chronic obstructive pulmonary disease) (HCC)   Bronchitis   Hypertension   Obesity   Tobacco abuse      DIET:  Cardiac Diet       Recommended activity: Activity as tolerated  Follow up : Follow-up Information     Follow up With Specialties Details Why Contact Info    [facility]   please call to make an appoitnement to be seen in one week [address]  [phone]    [facility], In-Home Clinical Assessments  as need Mobile Urgent Care That Comes To [facility] [facility]  [phone]        Patient Education   Patient Education        Learning About Lung Nodules  What is a lung nodule? A lung nodule is a growth in the lung. A single nodule surrounded by lung tissue is called a solitary pulmonary nodule. A lung nodule might not cause any symptoms. Your doctor may have found one or more nodules on your lung when you were having a chest X-ray or CT scan. Or it may have been found during a lung cancer screening. A lung nodule may be caused by an old infection or cancer. It might also be a noncancerous growth. Lung nodules can cause a screening to give an abnormal result. Most nodules do not cause any harm.  But without further tests, your doctor can't tell whether an abnormal finding is cancer, a harmless nodule, or something else. What can you expect when you have a lung nodule? Your doctor will look at several risk factors to see how likely it is that the nodule is cancer. He or she will look at:  · Whether you smoke or have ever smoked. · Your age and your family's medical history. · Whether you have ever had lung cancer. · The size and shape of the nodule. · Whether the nodule has changed in size. Your doctor may look at past chest X-rays or CT scans, if available, and compare them. Or you may have a series of CT scans to see if the nodule grows over time. What happens next depends on the risk of the nodule being cancer. · If you have no risk factors and the nodule is small, your doctor may advise doing nothing. · If the risk is small, your doctor may schedule follow-up appointments and tests. You may have more CT scans later to see if the nodule is growing. If the nodule hasn't changed in 3 to 6 months, you may have CT scans every year. If it hasn't changed in 2 years, you may not need any more tests. · If there's a higher risk of cancer, your doctor may:  ? Do a PET scan, which may help tell if the nodule is cancerous or not. ? Take a sample of tissue from the nodule for testing. This is called a biopsy. ? Remove the nodule with surgery. Follow-up care is a key part of your treatment and safety. Be sure to make and go to all appointments, and call your doctor if you are having problems. It's also a good idea to know your test results and keep a list of the medicines you take. Where can you learn more? Go to http://rocío-caleb.info/. Enter L951 in the search box to learn more about \"Learning About Lung Nodules. \"  Current as of: December 19, 2018  Content Version: 12.2  © 3980-5082 [employer]. Care instructions adapted under license by [employer] (which disclaims liability or warranty for this information).  If you have questions about a medical condition or this instruction, always ask your healthcare professional. [address] any warranty or liability for your use of this information. Stopping Smokeless Tobacco Use: Care Instructions  Your Care Instructions    Smokeless tobacco comes in many forms, such as snuff and chewing tobacco:  · Snuff is finely ground tobacco sold in cans or pouches. Most of the time, snuff is used by putting a \"pinch\" or \"dip\" between the lower lip or cheek and the gum. · Chewing tobacco is sold as loose leaves, plugs, or twists. It is chewed or placed between the cheek and the gum or teeth. There are plenty of reasons to stop using smokeless tobacco. These products are harmful. They are not risk-free alternatives to smoking. Smokeless tobacco contains nicotine, which is addicting. Though using smokeless tobacco is less harmful than smoking cigarettes, it can cause serious health problems, such as:  · White patches or red sores in your mouth that can turn into mouth cancer involving the lip, tongue, or cheek. · Tooth loss and other dental problems. · Gum disease. Your gums may pull away from your teeth and not grow back. People who use smokeless tobacco crave the nicotine in it. Giving up smokeless tobacco is much harder than simply changing a habit. Your body has to stop craving the nicotine. It is hard to quit, but you can do it. Many tools are available for people who want to quit using smokeless tobacco. You may find that combining tools works best for you. There are several steps to quitting. First you get ready to quit. Then you get support to help you. After that, you learn new skills and behaviors to quit. For many people, a necessary step is getting and using medicine. Your doctor will help you set up the plan that best meets your needs. You may want to attend a tobacco cessation program. When you choose a program, look for one that has proven success.  Ask your doctor for ideas. You will greatly increase your chances of success if you take medicine as well as get counseling or join a cessation program.  Some of the changes you feel when you first quit smokeless tobacco are uncomfortable. Your body will miss the nicotine at first, and you may feel short-tempered and grumpy. You may have trouble sleeping or concentrating. Medicine can help you deal with these symptoms. You may struggle with changing your habits and rituals. The last step is the tricky one: Be prepared for the urge to use smokeless tobacco to continue for a time. This is a lot to deal with, but keep at it. You will feel better. Follow-up care is a key part of your treatment and safety. Be sure to make and go to all appointments, and call your doctor if you are having problems. It's also a good idea to know your test results and keep a list of the medicines you take. How can you care for yourself at home? · Ask your family, friends, and coworkers for support. You have a better chance of quitting if you have help and support. · Join a support group for people who are trying to quit using smokeless tobacco.  · Set a quit date. Pick your date carefully so that it is not right in the middle of a big deadline or stressful time. After you quit, do not use smokeless tobacco even once. Get rid of all spit cups, cans, and pouches after your last use. Clean your house and your clothes so that they do not smell of tobacco.  · Learn how to be a non-user. Think about ways you can avoid those things that make you reach for tobacco.  ? Learn some ways to deal with cravings, like calling a friend or going for a walk. Cravings often pass. ? Avoid situations that put you at greatest risk for using smokeless tobacco. For some people, it is hard to spend time with friends without dipping or chewing. For others, they might skip a coffee break with coworkers who smoke or use smokeless tobacco.  ?  Change your daily routine. Take a different route to work, or eat a meal in a different place. · Cut down on stress. Calm yourself or release tension by doing an activity you enjoy, such as reading a book, taking a hot bath, or gardening. · Talk to your doctor or pharmacist about nicotine replacement therapy. You still get nicotine, but you do not use tobacco. Nicotine replacement products help you slowly reduce the amount of nicotine you need. Many of these products are available over the counter. They include nicotine patches, gum, lozenges, and inhalers. · Ask your doctor about bupropion (Wellbutrin) or varenicline (Chantix), which are prescription medicines. They do not contain nicotine. They help you by reducing withdrawal symptoms, such as stress and anxiety. · Get regular exercise. Having healthy habits will help your body move past its craving for nicotine. · Be prepared to keep trying. Most people are not successful the first few times they try to quit. Do not get mad at yourself if you use tobacco again. Make a list of things you learned, and think about when you want to try again, such as next week, next month, or next year. Where can you learn more? Go to http://rocío-caleb.info/. Enter Z787 in the search box to learn more about \"Stopping Smokeless Tobacco Use: Care Instructions. \"  Current as of: September 26, 2018  Content Version: 12.2  © 0889-1659 [employer]. Care instructions adapted under license by [employer] (which disclaims liability or warranty for this information). If you have questions about a medical condition or this instruction, always ask your healthcare professional. [address] any warranty or liability for your use of this information. · It is important that you take the medication exactly as they are prescribed.    · Keep your medication in the bottles provided by the pharmacist and keep a list of the medication names, dosages, and times to be taken in your wallet. · Do not take other medications without consulting your doctor. ADDITIONAL INFORMATION: If you experience any of the following symptoms or have any health problem not listed below, then please call your primary care physician or return to the emergency room if you cannot get hold of your doctor: Fever, chills, nausea, vomiting, diarrhea, change in mentation, falling, bleeding, shortness of breath. I understand that if any problems occur once I am discharged, I am supposed to call my Primary care physician for further care or seek help in the Emergency Department at the nearest Healthcare facility. I have had an opportunity to discuss my clinical issues with my doctor and nursing staff. I understand and acknowledge receipt of the above instructions.                                                                                                                                            Physician's or R.N.'s Signature                                                            Date/Time                                                                                                                                              Patient or Representative Signature                                                 Date/Time

## 2019-12-08 NOTE — PROGRESS NOTES
12/07/19 1959 12/07/19 2018   Pre-Treatment   Breathing Pattern Regular  --    Cough Non-productive; Congested  --    Left Breath Sounds Coarse; Expiratory wheezing  --    Right Breath Sounds Coarse; Expiratory wheezing  --    Pulse 73  --    SpO2 93 %  --    Respirations 20  --    Peak Flow 240  --    Post-Treatment   Breathing Pattern  --  Regular   Cough  --  Non-productive; Congested   Upper Airway Sounds  --  Coarse   Left Breath Sounds  --  Expiratory wheezing   Right Breath Sounds  --  Expiratory wheezing   Pulse  --  71   SpO2  --  95 %   Respirations  --  20   Treatment Tolerance  --  Patient tolerated   Peak Flow  --  200

## 2019-12-08 NOTE — PROGRESS NOTES

## 2019-12-08 NOTE — PROGRESS NOTES
Problem: Chronic Obstructive Pulmonary Disease (COPD)  Goal: *Oxygen saturation during activity within specified parameters  Outcome: Resolved/Met  Goal: *Able to remain out of bed as prescribed  Outcome: Resolved/Met  Goal: *Absence of hypoxia  Outcome: Resolved/Met     Problem: Patient Education: Go to Patient Education Activity  Goal: Patient/Family Education  Outcome: Resolved/Met     Problem: Falls - Risk of  Goal: *Absence of Falls  Description  Document Danyel Fall Risk and appropriate interventions in the flowsheet. Outcome: Resolved/Met  Note: Fall Risk Interventions:            Medication Interventions: Patient to call before getting OOB         History of Falls Interventions:  Investigate reason for fall         Problem: Patient Education: Go to Patient Education Activity  Goal: Patient/Family Education  Outcome: Resolved/Met     Problem: Pain  Goal: *Control of Pain  Outcome: Resolved/Met  Goal: *PALLIATIVE CARE:  Alleviation of Pain  Outcome: Resolved/Met     Problem: Patient Education: Go to Patient Education Activity  Goal: Patient/Family Education  Outcome: Resolved/Met

## 2019-12-08 NOTE — PROGRESS NOTES
KYLE Plan:    -d/c home w/sister. -F/U w/PCP (Dr. Joanie Dimas). Pt to d/c home w/sister whom she lives with. Pt declined H2H even though her admissions diagnosis COPD qualifies. Medicare pt has received, reviewed, and signed 2nd IM letter informing them of their right to appeal the discharge. Signed copy has been placed on pt bedside chart.       GEORGE Bermudez

## 2019-12-08 NOTE — PROGRESS NOTES
Oncology End of Shift Note      Bedside shift change report given to [facility], RN (incoming nurse) by Marcela Piedra RN (outgoing nurse) on Mike Strange. Report included the following information SBAR, Kardex and ED Summary. Shift Summary: Pt getting breathing treatments, pain well controlled with PRN meds. Pt ready for DC in the morning. Issues for Physician to Address:       Patient on Cardiac Monitoring?     [] Yes  [x] No    Rhythm:              Marcela Piedra RN

## 2019-12-09 LAB
BACTERIA SPEC CULT: NORMAL
SERVICE CMNT-IMP: NORMAL

## 2020-01-07 ENCOUNTER — HOSPITAL ENCOUNTER (OUTPATIENT)
Age: 75
Discharge: HOME | End: 2020-01-08
Attending: INTERNAL MEDICINE | Admitting: INTERNAL MEDICINE
Payer: MEDICARE

## 2020-01-07 DIAGNOSIS — R07.9 CHEST PAIN, UNSPECIFIED TYPE: ICD-10-CM

## 2020-01-07 LAB
ATRIAL RATE: 80 BPM
CALCULATED P AXIS, ECG09: 56 DEGREES
CALCULATED R AXIS, ECG10: -60 DEGREES
CALCULATED T AXIS, ECG11: 73 DEGREES
DIAGNOSIS, 93000: NORMAL
P-R INTERVAL, ECG05: 176 MS
Q-T INTERVAL, ECG07: 412 MS
QRS DURATION, ECG06: 108 MS
QTC CALCULATION (BEZET), ECG08: 475 MS
VENTRICULAR RATE, ECG03: 80 BPM

## 2020-01-07 PROCEDURE — C1725 CATH, TRANSLUMIN NON-LASER: HCPCS | Performed by: INTERNAL MEDICINE

## 2020-01-07 PROCEDURE — 77030010221 HC SPLNT WR POS TELE -B: Performed by: INTERNAL MEDICINE

## 2020-01-07 PROCEDURE — 93458 L HRT ARTERY/VENTRICLE ANGIO: CPT | Performed by: INTERNAL MEDICINE

## 2020-01-07 PROCEDURE — 77030004549 HC CATH ANGI DX PRF MRTM -A: Performed by: INTERNAL MEDICINE

## 2020-01-07 PROCEDURE — 77030030195 HC CATH ANGI DX PRF4 MRTM -A: Performed by: INTERNAL MEDICINE

## 2020-01-07 PROCEDURE — 77030019697 HC SYR ANGI INFL MRTM -B: Performed by: INTERNAL MEDICINE

## 2020-01-07 PROCEDURE — 77030038269 HC DRN EXT URIN PURWCK BARD -A

## 2020-01-07 PROCEDURE — C1769 GUIDE WIRE: HCPCS | Performed by: INTERNAL MEDICINE

## 2020-01-07 PROCEDURE — 99153 MOD SED SAME PHYS/QHP EA: CPT | Performed by: INTERNAL MEDICINE

## 2020-01-07 PROCEDURE — C1874 STENT, COATED/COV W/DEL SYS: HCPCS | Performed by: INTERNAL MEDICINE

## 2020-01-07 PROCEDURE — C1894 INTRO/SHEATH, NON-LASER: HCPCS | Performed by: INTERNAL MEDICINE

## 2020-01-07 PROCEDURE — 74011250637 HC RX REV CODE- 250/637: Performed by: INTERNAL MEDICINE

## 2020-01-07 PROCEDURE — 77030029684 HC NEB SM VOL KT MONA -A

## 2020-01-07 PROCEDURE — C1887 CATHETER, GUIDING: HCPCS | Performed by: INTERNAL MEDICINE

## 2020-01-07 PROCEDURE — 74011250636 HC RX REV CODE- 250/636: Performed by: INTERNAL MEDICINE

## 2020-01-07 PROCEDURE — 74011000250 HC RX REV CODE- 250: Performed by: INTERNAL MEDICINE

## 2020-01-07 PROCEDURE — 77030019569 HC BND COMPR RAD TERU -B: Performed by: INTERNAL MEDICINE

## 2020-01-07 PROCEDURE — 77030028837 HC SYR ANGI PWR INJ COEU -A: Performed by: INTERNAL MEDICINE

## 2020-01-07 PROCEDURE — 85347 COAGULATION TIME ACTIVATED: CPT

## 2020-01-07 PROCEDURE — 77030019698 HC SYR ANGI MDLON MRTM -A: Performed by: INTERNAL MEDICINE

## 2020-01-07 PROCEDURE — 92943 PRQ TRLUML REVSC CH OCC ANT: CPT | Performed by: INTERNAL MEDICINE

## 2020-01-07 PROCEDURE — 77030008543 HC TBNG MON PRSS MRTM -A: Performed by: INTERNAL MEDICINE

## 2020-01-07 PROCEDURE — 93005 ELECTROCARDIOGRAM TRACING: CPT

## 2020-01-07 PROCEDURE — 94640 AIRWAY INHALATION TREATMENT: CPT

## 2020-01-07 PROCEDURE — 99152 MOD SED SAME PHYS/QHP 5/>YRS: CPT | Performed by: INTERNAL MEDICINE

## 2020-01-07 DEVICE — STENT RONYX25015UX RESOLUTE ONYX 2.50X15
Type: IMPLANTABLE DEVICE | Status: FUNCTIONAL
Brand: RESOLUTE ONYX™

## 2020-01-07 DEVICE — STENT RONYX20030UX RESOLUTE ONYX 2.00X30
Type: IMPLANTABLE DEVICE | Status: FUNCTIONAL
Brand: RESOLUTE ONYX™

## 2020-01-07 RX ORDER — HEPARIN SODIUM 200 [USP'U]/100ML
INJECTION, SOLUTION INTRAVENOUS
Status: COMPLETED | OUTPATIENT
Start: 2020-01-07 | End: 2020-01-07

## 2020-01-07 RX ORDER — LANOLIN ALCOHOL/MO/W.PET/CERES
9 CREAM (GRAM) TOPICAL
Status: DISCONTINUED | OUTPATIENT
Start: 2020-01-07 | End: 2020-01-08 | Stop reason: HOSPADM

## 2020-01-07 RX ORDER — GUAIFENESIN 100 MG/5ML
81 LIQUID (ML) ORAL DAILY
Status: DISCONTINUED | OUTPATIENT
Start: 2020-01-08 | End: 2020-01-08 | Stop reason: HOSPADM

## 2020-01-07 RX ORDER — SODIUM CHLORIDE 0.9 % (FLUSH) 0.9 %
5-40 SYRINGE (ML) INJECTION AS NEEDED
Status: DISCONTINUED | OUTPATIENT
Start: 2020-01-07 | End: 2020-01-08 | Stop reason: HOSPADM

## 2020-01-07 RX ORDER — FENTANYL CITRATE 50 UG/ML
INJECTION, SOLUTION INTRAMUSCULAR; INTRAVENOUS AS NEEDED
Status: DISCONTINUED | OUTPATIENT
Start: 2020-01-07 | End: 2020-01-07 | Stop reason: HOSPADM

## 2020-01-07 RX ORDER — IPRATROPIUM BROMIDE AND ALBUTEROL SULFATE 2.5; .5 MG/3ML; MG/3ML
3 SOLUTION RESPIRATORY (INHALATION)
Status: DISCONTINUED | OUTPATIENT
Start: 2020-01-07 | End: 2020-01-08 | Stop reason: HOSPADM

## 2020-01-07 RX ORDER — DULOXETIN HYDROCHLORIDE 30 MG/1
30 CAPSULE, DELAYED RELEASE ORAL DAILY
Status: DISCONTINUED | OUTPATIENT
Start: 2020-01-08 | End: 2020-01-08 | Stop reason: HOSPADM

## 2020-01-07 RX ORDER — CLOPIDOGREL BISULFATE 75 MG/1
TABLET ORAL AS NEEDED
Status: DISCONTINUED | OUTPATIENT
Start: 2020-01-07 | End: 2020-01-07 | Stop reason: HOSPADM

## 2020-01-07 RX ORDER — DONEPEZIL HYDROCHLORIDE 5 MG/1
10 TABLET, FILM COATED ORAL
Status: DISCONTINUED | OUTPATIENT
Start: 2020-01-07 | End: 2020-01-08 | Stop reason: HOSPADM

## 2020-01-07 RX ORDER — HEPARIN SODIUM 1000 [USP'U]/ML
INJECTION, SOLUTION INTRAVENOUS; SUBCUTANEOUS AS NEEDED
Status: DISCONTINUED | OUTPATIENT
Start: 2020-01-07 | End: 2020-01-07 | Stop reason: HOSPADM

## 2020-01-07 RX ORDER — VERAPAMIL HYDROCHLORIDE 2.5 MG/ML
INJECTION, SOLUTION INTRAVENOUS AS NEEDED
Status: DISCONTINUED | OUTPATIENT
Start: 2020-01-07 | End: 2020-01-07 | Stop reason: HOSPADM

## 2020-01-07 RX ORDER — ONDANSETRON 2 MG/ML
4 INJECTION INTRAMUSCULAR; INTRAVENOUS
Status: DISCONTINUED | OUTPATIENT
Start: 2020-01-07 | End: 2020-01-08 | Stop reason: HOSPADM

## 2020-01-07 RX ORDER — MEMANTINE HYDROCHLORIDE 10 MG/1
10 TABLET ORAL 2 TIMES DAILY
Status: DISCONTINUED | OUTPATIENT
Start: 2020-01-07 | End: 2020-01-08 | Stop reason: HOSPADM

## 2020-01-07 RX ORDER — DOCUSATE SODIUM 100 MG/1
100 CAPSULE, LIQUID FILLED ORAL 2 TIMES DAILY
Status: DISCONTINUED | OUTPATIENT
Start: 2020-01-07 | End: 2020-01-08 | Stop reason: HOSPADM

## 2020-01-07 RX ORDER — SODIUM CHLORIDE 0.9 % (FLUSH) 0.9 %
5-40 SYRINGE (ML) INJECTION EVERY 8 HOURS
Status: DISCONTINUED | OUTPATIENT
Start: 2020-01-07 | End: 2020-01-08 | Stop reason: HOSPADM

## 2020-01-07 RX ORDER — CLOPIDOGREL BISULFATE 75 MG/1
75 TABLET ORAL DAILY
Status: DISCONTINUED | OUTPATIENT
Start: 2020-01-08 | End: 2020-01-08 | Stop reason: HOSPADM

## 2020-01-07 RX ORDER — MIDAZOLAM HYDROCHLORIDE 1 MG/ML
INJECTION, SOLUTION INTRAMUSCULAR; INTRAVENOUS AS NEEDED
Status: DISCONTINUED | OUTPATIENT
Start: 2020-01-07 | End: 2020-01-07 | Stop reason: HOSPADM

## 2020-01-07 RX ORDER — TRAZODONE HYDROCHLORIDE 100 MG/1
100 TABLET ORAL
Status: DISCONTINUED | OUTPATIENT
Start: 2020-01-07 | End: 2020-01-08 | Stop reason: HOSPADM

## 2020-01-07 RX ORDER — MORPHINE SULFATE 10 MG/ML
4 INJECTION, SOLUTION INTRAMUSCULAR; INTRAVENOUS
Status: DISCONTINUED | OUTPATIENT
Start: 2020-01-07 | End: 2020-01-08 | Stop reason: HOSPADM

## 2020-01-07 RX ORDER — ATORVASTATIN CALCIUM 40 MG/1
40 TABLET, FILM COATED ORAL
Status: DISCONTINUED | OUTPATIENT
Start: 2020-01-07 | End: 2020-01-08 | Stop reason: HOSPADM

## 2020-01-07 RX ORDER — ACETAMINOPHEN 325 MG/1
650 TABLET ORAL
Status: DISCONTINUED | OUTPATIENT
Start: 2020-01-07 | End: 2020-01-08 | Stop reason: HOSPADM

## 2020-01-07 RX ORDER — HYDROCODONE BITARTRATE AND ACETAMINOPHEN 5; 325 MG/1; MG/1
1 TABLET ORAL
Status: DISCONTINUED | OUTPATIENT
Start: 2020-01-07 | End: 2020-01-08 | Stop reason: HOSPADM

## 2020-01-07 RX ORDER — AMLODIPINE BESYLATE 5 MG/1
10 TABLET ORAL DAILY
Status: DISCONTINUED | OUTPATIENT
Start: 2020-01-08 | End: 2020-01-08 | Stop reason: HOSPADM

## 2020-01-07 RX ORDER — METOPROLOL SUCCINATE 25 MG/1
12.5 TABLET, EXTENDED RELEASE ORAL DAILY
Status: DISCONTINUED | OUTPATIENT
Start: 2020-01-08 | End: 2020-01-08 | Stop reason: HOSPADM

## 2020-01-07 RX ORDER — LIDOCAINE HYDROCHLORIDE 10 MG/ML
INJECTION, SOLUTION EPIDURAL; INFILTRATION; INTRACAUDAL; PERINEURAL AS NEEDED
Status: DISCONTINUED | OUTPATIENT
Start: 2020-01-07 | End: 2020-01-07 | Stop reason: HOSPADM

## 2020-01-07 RX ORDER — NALOXONE HYDROCHLORIDE 0.4 MG/ML
0.4 INJECTION, SOLUTION INTRAMUSCULAR; INTRAVENOUS; SUBCUTANEOUS AS NEEDED
Status: DISCONTINUED | OUTPATIENT
Start: 2020-01-07 | End: 2020-01-08 | Stop reason: HOSPADM

## 2020-01-07 RX ADMIN — ACETAMINOPHEN 650 MG: 325 TABLET ORAL at 14:03

## 2020-01-07 RX ADMIN — Medication 10 ML: at 14:01

## 2020-01-07 RX ADMIN — ARFORMOTEROL TARTRATE: 15 SOLUTION RESPIRATORY (INHALATION) at 20:01

## 2020-01-07 RX ADMIN — MEMANTINE HYDROCHLORIDE 10 MG: 10 TABLET ORAL at 16:39

## 2020-01-07 RX ADMIN — HYDROCODONE BITARTRATE AND ACETAMINOPHEN 1 TABLET: 5; 325 TABLET ORAL at 19:59

## 2020-01-07 RX ADMIN — DOCUSATE SODIUM 100 MG: 100 CAPSULE, LIQUID FILLED ORAL at 16:40

## 2020-01-07 NOTE — PROGRESS NOTES
1425: Patient complaining of headache at this time. VSS. Tylenol given for headache pain. Food tray and coffee in front of her however she says she cannot move her left arm ??? Fingers and  are strong but cannot physically move her arm. Will continue to monitor. 80: Checked with recovery RN who got the patient ready this AM and said that she was experiencing the same issues upon arrival. So no new changes post procedure.

## 2020-01-07 NOTE — PROGRESS NOTES
Cardiac Cath Lab Recovery Arrival Note:      Chip Umana arrived to Cardiac Cath Lab, Recovery Area. Staff introduced to patient. Patient identifiers verified with NAME and DATE OF BIRTH. Procedure verified with patient. Consent forms reviewed and signed by patient or authorized representative and verified. Allergies verified. Patient and family oriented to department. Patient and family informed of procedure and plan of care. Questions answered with review. Patient prepped for procedure, per orders from physician, prior to arrival.    Patient on cardiac monitor, non-invasive blood pressure, SPO2 monitor. On room air. Patient is A&Ox 3. Patient reports no c/o. Patient in stretcher, in low position, with side rails up, call bell within reach, patient instructed to call if assistance as needed. Patient prep in: [address], [place]. Patient family has pager # none  Family in: none .    Prep by: Tip Haider RN and April Hernandez RN

## 2020-01-07 NOTE — Clinical Note
Lesion located in the Mid Cx. Balloon inserted. Balloon inflated using multiple inflations inflation technique. Pressure = 10 abdiel; Duration = 14 sec. Inflation 2: Pressure: 10 abdiel; Duration: 14 sec.

## 2020-01-07 NOTE — Clinical Note
Lesion located in the Proximal Cx. Balloon inserted. Balloon inflated using multiple inflations inflation technique. Pressure = 10 abdiel; Duration = 24 sec. Inflation 2: Pressure: 12 abdiel; Duration: 24 sec. Inflation 3: Pressure: 12 abdiel; Duration: 15 sec. Inflation 4: Pressure: 12 abdiel; Duration: 14 sec. Inflation 5: Pressure: 12 abdiel; Duration: 12 sec.

## 2020-01-07 NOTE — PROGRESS NOTES
Brief Procedure Note    Patient: Milo Steward MRN: [id number]  SSN: [id number]    YOB: 1945  Age: 66 y.o. Sex: female      Date of Procedure: 1/7/2020     Pre-procedure Diagnosis: [place], Abn stress    Post-procedure Diagnosis: 1v CAD (LCx ), patent LAD/RCA stent, nml LVEF    Procedure: LHC, cors, LVgra, PTA/PCI of LCx  w/ 2 MARIELY    Performed By: Dia Gomes III, DO     Anesthesia: Moderate Sedation    Estimated Blood Loss: Less than 10 mL      Specimens:  None    Findings: as above    Complications: None    Implants: 2 [place] MARIELY    Recommendations: Continue medical therapy.     Signed By: Dia Gomes III, DO     January 7, 2020

## 2020-01-07 NOTE — Clinical Note
TRANSFER - OUT REPORT:     Verbal report given to: subha. Report consisted of patient's Situation, Background, Assessment and   Recommendations(SBAR). Opportunity for questions and clarification was provided. Patient transported with a Registered Nurse. Patient transported to: [facility].

## 2020-01-07 NOTE — Clinical Note
Lesion: Located in the Mid Cx. Stent inserted. Stent deployed. First inflation pressure = 12 abdiel; inflation time: 20 sec.

## 2020-01-07 NOTE — Clinical Note
Lesion: Located in the Proximal Cx. Stent inserted. Stent deployed. Single technique and multiple inflations used. First inflation pressure = 12 abdiel; inflation time: 20 sec. Second inflation pressure: 8 abdiel; inflation time: 10 sec.

## 2020-01-07 NOTE — Clinical Note
Single view of the left ventricle obtained using power injection. Total volume = 24 mL. Rate = 12 mL/sec. Pressure = 900 PSI.

## 2020-01-08 VITALS
TEMPERATURE: 98 F | SYSTOLIC BLOOD PRESSURE: 132 MMHG | DIASTOLIC BLOOD PRESSURE: 69 MMHG | HEART RATE: 77 BPM | OXYGEN SATURATION: 94 % | BODY MASS INDEX: 30.21 KG/M2 | WEIGHT: 160 LBS | RESPIRATION RATE: 15 BRPM | HEIGHT: 61 IN

## 2020-01-08 LAB
ANION GAP SERPL CALC-SCNC: 6 MMOL/L (ref 5–15)
BASOPHILS # BLD: 0.2 K/UL (ref 0–0.1)
BASOPHILS NFR BLD: 1 % (ref 0–1)
BUN SERPL-MCNC: 22 MG/DL (ref 6–20)
BUN/CREAT SERPL: 16 (ref 12–20)
CALCIUM SERPL-MCNC: 8.9 MG/DL (ref 8.5–10.1)
CHLORIDE SERPL-SCNC: 112 MMOL/L (ref 97–108)
CO2 SERPL-SCNC: 22 MMOL/L (ref 21–32)
CREAT SERPL-MCNC: 1.41 MG/DL (ref 0.55–1.02)
DIFFERENTIAL METHOD BLD: ABNORMAL
EOSINOPHIL # BLD: 0.2 K/UL (ref 0–0.4)
EOSINOPHIL NFR BLD: 1 % (ref 0–7)
ERYTHROCYTE [DISTWIDTH] IN BLOOD BY AUTOMATED COUNT: 14 % (ref 11.5–14.5)
GLUCOSE SERPL-MCNC: 100 MG/DL (ref 65–100)
HCT VFR BLD AUTO: 46 % (ref 35–47)
HGB BLD-MCNC: 15.2 G/DL (ref 11.5–16)
IMM GRANULOCYTES # BLD AUTO: 0.2 K/UL (ref 0–0.04)
IMM GRANULOCYTES NFR BLD AUTO: 1 % (ref 0–0.5)
LYMPHOCYTES # BLD: 2.5 K/UL (ref 0.8–3.5)
LYMPHOCYTES NFR BLD: 14 % (ref 12–49)
MCH RBC QN AUTO: 30.1 PG (ref 26–34)
MCHC RBC AUTO-ENTMCNC: 33 G/DL (ref 30–36.5)
MCV RBC AUTO: 91.1 FL (ref 80–99)
MONOCYTES # BLD: 1.6 K/UL (ref 0–1)
MONOCYTES NFR BLD: 9 % (ref 5–13)
NEUTS SEG # BLD: 13.2 K/UL (ref 1.8–8)
NEUTS SEG NFR BLD: 74 % (ref 32–75)
NRBC # BLD: 0 K/UL (ref 0–0.01)
NRBC BLD-RTO: 0 PER 100 WBC
PLATELET # BLD AUTO: 360 K/UL (ref 150–400)
PMV BLD AUTO: 9 FL (ref 8.9–12.9)
POTASSIUM SERPL-SCNC: 4.2 MMOL/L (ref 3.5–5.1)
RBC # BLD AUTO: 5.05 M/UL (ref 3.8–5.2)
RBC MORPH BLD: ABNORMAL
SODIUM SERPL-SCNC: 140 MMOL/L (ref 136–145)
WBC # BLD AUTO: 17.9 K/UL (ref 3.6–11)

## 2020-01-08 PROCEDURE — 74011636320 HC RX REV CODE- 636/320: Performed by: INTERNAL MEDICINE

## 2020-01-08 PROCEDURE — 74011250637 HC RX REV CODE- 250/637: Performed by: INTERNAL MEDICINE

## 2020-01-08 PROCEDURE — 77010033678 HC OXYGEN DAILY

## 2020-01-08 PROCEDURE — 94640 AIRWAY INHALATION TREATMENT: CPT

## 2020-01-08 PROCEDURE — 74011000250 HC RX REV CODE- 250: Performed by: INTERNAL MEDICINE

## 2020-01-08 PROCEDURE — 36415 COLL VENOUS BLD VENIPUNCTURE: CPT

## 2020-01-08 PROCEDURE — 85025 COMPLETE CBC W/AUTO DIFF WBC: CPT

## 2020-01-08 PROCEDURE — 80048 BASIC METABOLIC PNL TOTAL CA: CPT

## 2020-01-08 RX ORDER — ATORVASTATIN CALCIUM 40 MG/1
40 TABLET, FILM COATED ORAL
Qty: 30 TAB | Refills: 11 | Status: SHIPPED | OUTPATIENT
Start: 2020-01-08

## 2020-01-08 RX ORDER — CLOPIDOGREL BISULFATE 75 MG/1
75 TABLET ORAL DAILY
Qty: 30 TAB | Refills: 11 | Status: SHIPPED | OUTPATIENT
Start: 2020-01-08

## 2020-01-08 RX ORDER — GUAIFENESIN 100 MG/5ML
81 LIQUID (ML) ORAL DAILY
Qty: 30 TAB | Refills: 11 | Status: SHIPPED | OUTPATIENT
Start: 2020-01-08

## 2020-01-08 RX ORDER — METOPROLOL SUCCINATE 25 MG/1
12.5 TABLET, EXTENDED RELEASE ORAL DAILY
Qty: 15 TAB | Refills: 11 | Status: SHIPPED | OUTPATIENT
Start: 2020-01-08

## 2020-01-08 RX ADMIN — Medication 10 ML: at 04:56

## 2020-01-08 RX ADMIN — CLOPIDOGREL BISULFATE 75 MG: 75 TABLET ORAL at 08:08

## 2020-01-08 RX ADMIN — DULOXETINE HYDROCHLORIDE 30 MG: 30 CAPSULE, DELAYED RELEASE ORAL at 08:08

## 2020-01-08 RX ADMIN — AMLODIPINE BESYLATE 10 MG: 5 TABLET ORAL at 08:08

## 2020-01-08 RX ADMIN — GABAPENTIN 400 MG: 100 CAPSULE ORAL at 08:08

## 2020-01-08 RX ADMIN — ARFORMOTEROL TARTRATE: 15 SOLUTION RESPIRATORY (INHALATION) at 07:38

## 2020-01-08 RX ADMIN — ASPIRIN 81 MG 81 MG: 81 TABLET ORAL at 08:08

## 2020-01-08 RX ADMIN — ACETAMINOPHEN 650 MG: 325 TABLET ORAL at 08:40

## 2020-01-08 RX ADMIN — HYDROCODONE BITARTRATE AND ACETAMINOPHEN 1 TABLET: 5; 325 TABLET ORAL at 09:48

## 2020-01-08 RX ADMIN — METOPROLOL SUCCINATE 12.5 MG: 25 TABLET, EXTENDED RELEASE ORAL at 08:09

## 2020-01-08 RX ADMIN — TRAZODONE HYDROCHLORIDE 100 MG: 100 TABLET ORAL at 00:06

## 2020-01-08 RX ADMIN — DONEPEZIL HYDROCHLORIDE 10 MG: 5 TABLET, FILM COATED ORAL at 00:11

## 2020-01-08 RX ADMIN — DOCUSATE SODIUM 100 MG: 100 CAPSULE, LIQUID FILLED ORAL at 08:09

## 2020-01-08 RX ADMIN — MELATONIN 9 MG: at 00:06

## 2020-01-08 RX ADMIN — MEMANTINE HYDROCHLORIDE 10 MG: 10 TABLET ORAL at 08:08

## 2020-01-08 RX ADMIN — GABAPENTIN 500 MG: 100 CAPSULE ORAL at 00:05

## 2020-01-08 RX ADMIN — ATORVASTATIN CALCIUM 40 MG: 40 TABLET, FILM COATED ORAL at 00:04

## 2020-01-08 NOTE — PROGRESS NOTES
Patient repositioned in bed and medicated with norco for her chronic pain    2355  CHG bath done, chau care done, linen and gown changed    0430  Patient agreed to ambulate in the room in the morning.    0725  Bedside shift change report given to Sangita Younger  (oncoming nurse) by Myrtle Phillips (offgoing nurse). Report included the following information SBAR.  Chau care done for patient, patient is now in sitting in the chair, tolerating activity without complaint

## 2020-01-08 NOTE — PROGRESS NOTES
Discharge instructions reviewed with Patient. Patient verbalized understanding of instructions. Opportunities for questions were provided and explained. Patient will be getting discharged Home. Discharge medications reviewed with patient and appropriate educational materials and side effects teaching were provided.

## 2020-01-08 NOTE — PROGRESS NOTES
Reason for Admission:   Cardiac cath                   RRAT Score:       0              Plan for utilizing home health:      None                    Current Advanced Directive/Advance Care Plan:  Has both advance directive and MPOA- Encouraged patient to provide a copy                         Transition of Care Plan:      Patient verified demographics. Patient is fully independent and stated her sister is transporting her home at 6 AM. Patient to F/U with PCP and cardiology. Oral and Written notification given to patient and/or caregiver informing them that they are currently an Outpatient receiving care in our facility. Outpatient services include Observation Services. Care Management Interventions  PCP Verified by CM:  Yes  Physical Therapy Consult: No  Occupational Therapy Consult: No  Speech Therapy Consult: No  Confirm Follow Up Transport: Family  Discharge Location  Discharge Placement: [facility], [address]

## 2020-01-08 NOTE — DISCHARGE INSTRUCTIONS
[address] [facility], [address]   [phone]  [address]    [email or web address]    Patient Discharge Instructions    Ning James / [id number] : 1945    Admitted 2020 Discharged: 2020       · It is important that you take the medication exactly as they are prescribed. · Keep your medication in the bottles provided by the pharmacist and keep a list of the medication names, dosages, and times to be taken in your wallet. · Do not take other medications without consulting your doctor. BRING ALL OF YOUR MEDICINES TO YOUR OFFICE VISIT with Jody Crenshaw DO or my nurse practitioner, Terry Ferreira. .    Follow-up with Jody Crenshaw DO or my nurse practitioner, Terry Ferreira in 2 weeks. Cardiac Catheterization  Discharge Instructions    Transradial Catheterization Discharge Instructions (WRIST)    Discharge instructions: Your radial artery in your wrist was used for your cardiac catheterization. This site may be slightly bruised and sore following your procedure. Expect mild tingling or the hand and tenderness at the puncture site for up to 3 days. Excess movement of the wrist used should be avoided for the next 24-48 hours. 1. No lifting over 2 pounds (approximately a ½ gallon of milk) with this arm for 24 hours. 2. Keep the site of the procedure covered with a bandage for 24 hours. 3. You may shower the day after your procedure. Do not take a tub bath or submerge the puncture site in water for 48 hours. 4. No heavy impact activity/lifting > 30 pounds for 1 week. If bleeding of the wrist occurs at home:   If the site on your wrist where you had the catheterization procedure begins to bleed, do not panic. 1. Place 1 or 2 fingers over the puncture site and hold pressure to stop the bleeding. You may be able to feel your pulse as you hold pressure. 2. Lift your fingers after 5 minutes to see if the bleeding has stopped.    3. Once the bleeding has stopped, gently wipe the wrist area clean and cover with a bandage. If the bleeding from your wrist does not stop after 15 minutes, or if there is a large amount of bleeding or spurting, call 911 immediately (do not drive yourself to the hospital). Other concerns: The site may be slightly bruised and sore following your procedure. Should any of the following occur, contact your physician immediately:   1. Any cool or coldness of the arm, discoloration over a large area, ongoing numbness or any abnormal sensations , moderate to severe pain or swelling in the arm. 2. Redness, soreness, swelling, chills or fever, or colored drainage at the procedure site within 3-7 days after your procedure. If you have any further questions or concerns regarding your procedure please call the Cardiac Cath Lab office at [phone]. During regular business hours ask to speak to Dr. Antoine Alvares. During non-business hours the answering service will answer. Ask to speak to the physician on call for [facility].     Transfemoral Catheterization Discharge Instructions Abi Johnson)     Do not drive, operate any machinery, or sign any legal documents for 24 hours after your procedure. You must have someone to drive you home.  You may take a shower 24 hours after your cardiac catheterization. Be sure to get the dressing wet and then remove it; gently wash the area with warm soapy water. Pat dry and leave open to air. To help prevent infections, be sure to keep the cath site clean and dry. No lotions, creams, powders, ointments, etc. in the cath site for approximately 1 week.  Do not take a tub bath, get in a hot tub or swimming pool for approximately 5 days or until the cath site is completely healed.  No strenuous activity or heavy lifting over 10 lbs. for 7 days.  Drink plenty of fluids for 24-48 hours after your cath to flush the contrast dye from your kidneys.  No alcoholic beverages for 24 hours. You may resume your previous diet (low fat, low cholesterol) after your cath.  After your cath, some bruising or discomfort is common during the healing process. Tylenol, 1-2 tablets every 6 hours as needed, is recommended if you experience any discomfort. If you experience any signs or symptoms of infection such as fever, chills, or poorly healing incision, persistent tenderness or swelling in the groin, redness and/or warmth to the touch, numbness, significant tingling or pain at the groin site or affected extremity, rash, drainage from the cath site, or if the leg feels tight or swollen, call your physician right away.  If bleeding at the cath site occurs, take a clean gauze pad and apply direct pressure to the groin just above the puncture site. Call 911 immediately, and continue to apply direct pressure until an ambulance gets to your location.  You may return to work  2  days after your cardiac cath if no groin bleeding. Information obtained by :  I understand that if any problems occur once I am at home I am to contact my physician. I understand and acknowledge receipt of the instructions indicated above. R.N.'s Signature                                                                  Date/Time                                                                                                                                              Patient or Representative Signature                                                          Date/Time      Sarah Sarkar III, DO             [address] [place] [address]    [phone]  [address]    [email or web address]              Smoking Cessation Program:   This is a free, phone/text/email based, smoking cessation program. The program is individualized to meet each patient's needs. To enroll use this link [email or web address]

## 2020-01-08 NOTE — PROGRESS NOTES
ADULT PROTOCOL: JET AEROSOL ASSESSMENT    Patient  Magi Alexander     66 y.o.   female     1/7/2020  11:37 PM    Breath Sounds Pre Procedure: Right Breath Sounds: Upper, Coarse                               Left Breath Sounds: Upper, Coarse    Breath Sounds Post Procedure: Right Breath Sounds: Upper, Coarse                                 Left Breath Sounds: Upper, Coarse    Breathing pattern: Pre procedure Breathing Pattern: Regular          Post procedure Breathing Pattern: Regular    Heart Rate: Pre procedure Pulse: 73           Post procedure Pulse: 70    Resp Rate: Pre procedure Respirations: 20           Post procedure Respirations: 20    Peak Flow: Pre bronchodilator   n/a          Post bronchodilator   n/a    Incentive Spirometry:   n/a      n/a    Cough: Pre procedure Cough: Non-productive, Hacking               Post procedure Cough: Non-productive, Hacking    Sputum: Pre procedure  none                 Post procedure  none    Oxygen: O2 Device: Nasal cannula   Flow rate (L/min) 2     Changed: NO    SpO2: Pre procedure SpO2: 94 %   with oxygen              Post procedure SpO2: 94 %  with oxygen    Nebulizer Therapy: Current medications Aerosolized Medications: Brovana, Pulmicort      Changed: NO    Smoking History: yes    Problem List:   Patient Active Problem List   Diagnosis Code    COPD exacerbation ([facility] [[email or web address] [facility].) J44.1    COPD (chronic obstructive pulmonary disease) ([facility]) J44.9    Bronchitis J40    Hypertension I10    Obesity E66.9    Tobacco abuse Z72.0    Pulmonary nodule R91.1       Respiratory Therapist: Seble Oliva, RT

## 2020-01-08 NOTE — CARDIO/PULMONARY
Cardiac Rehab Note: chart review     S/P PCI/stenting 1/7/2020    Smoking history assessed. Patient is a current smoker. EF 55%  on 1/7/2020 per cardiac cath    CAD education folder, with heart heathy diet, warning signs, heart facts, catheterization brochure, and out patient cardiac rehab program provided to Natalya Cortes on 1/8/2020                                              Educated using teach back method. Reviewed CAD diagnosis definition and purpose of intervention. Discussed risk factors for CAD to include the following: family history, elevated BMI, hyperlipidemia, hypertension,  and stress. Discussed Heart Healthy/Low Sodium (less than 2000 mg) diet. Reviewed the importance of medication compliance(Plavix), follow up appointments with cardiologist, signs and symptoms of angina, and what to report to physician after discharge. Emphasized the value of cardiac rehab. Discussed Cardiac Rehab Program format, benefits, and encouraged enrollment to assist with risk modification and management. Initial Cardiac Rehab Program intake appointment scheduled for February 3,2020 at 1 pm and appointment information is on the AVS. Patient provided orientation packet, instructed to complete packet a couple days prior to appointment, wear gym appropriate clothing and shoes, and bring current list of medications. Patient is to call if unable to keep appointment, need to reschedule or additional questions. Pt will need Lyft ride as she does not drive and sister cannot see to drive well. Natalya Cortes verbalized understanding with questions answered.   Mando Maya, RN

## 2020-01-08 NOTE — PROGRESS NOTES
Bedrest complete. Assisted patient with walking in the bosch. No signs or symptoms of chest pain, shortness of breath, or dizziness. Tolerated well. VSS.

## 2020-01-10 LAB
ACT BLD: 224 SECS (ref 79–138)
ACT BLD: 307 SECS (ref 79–138)

## 2020-02-10 ENCOUNTER — TELEPHONE (OUTPATIENT)
Dept: CARDIAC REHAB | Age: 75
End: 2020-02-10

## 2020-02-17 NOTE — TELEPHONE ENCOUNTER
Spoke with patient on phone. She states that she is unable to participate in Cardiac Rehab at this time.

## 2020-03-11 ENCOUNTER — TELEPHONE (OUTPATIENT)
Dept: NEUROLOGY | Age: 75
End: 2020-03-11

## 2020-10-30 ENCOUNTER — TRANSCRIBE ORDER (OUTPATIENT)
Dept: SCHEDULING | Age: 75
End: 2020-10-30

## 2020-10-30 DIAGNOSIS — R91.1 SOLITARY PULMONARY NODULE: Primary | ICD-10-CM

## (undated) DEVICE — MINI TREK CORONARY DILATATION CATHETER 2.0 MM X 20 MM / RAPID-EXCHANGE: Brand: MINI TREK

## (undated) DEVICE — ANGIOGRAPHY KIT CUST [K0910930B] [MERIT MEDICAL SYSTEMS INC]

## (undated) DEVICE — SYRINGE ANGIO 10 CC BRL STD PRNT POLYCARB LT BLU MEDALLION

## (undated) DEVICE — SYR POWER 150ML 8IN FILL TUBE --

## (undated) DEVICE — RUNTHROUGH NS EXTRA FLOPPY PTCA GUIDEWIRE: Brand: RUNTHROUGH

## (undated) DEVICE — ROSEN CURVED WIRE GUIDE: Brand: ROSEN

## (undated) DEVICE — TR BAND RADIAL ARTERY COMPRESSION DEVICE: Brand: TR BAND

## (undated) DEVICE — CATHETER ETER ANGIO L110CM OD5FR ID046IN L75CM 038IN 145DEG CARD

## (undated) DEVICE — Device: Brand: FIELDER XT

## (undated) DEVICE — MINI TREK CORONARY DILATATION CATHETER 1.20 MM X 15 MM / RAPID-EXCHANGE: Brand: MINI TREK

## (undated) DEVICE — CUSTOM KT PTCA INFL DEV K05 00053H

## (undated) DEVICE — CATHETER ANGIO JL3.5 AD 5 FRX100 CM PERFORMA

## (undated) DEVICE — TREK CORONARY DILATATION CATHETER 2.25 MM X 12 MM / RAPID-EXCHANGE: Brand: TREK

## (undated) DEVICE — PACK PROCEDURE SURG HRT CATH

## (undated) DEVICE — SYR ART 700 CLEAR MARK 7 -- ARTERION

## (undated) DEVICE — Device: Brand: CARAVEL

## (undated) DEVICE — HI-TORQUE PILOT 200 GUIDE WIRE .014 STRAIGHT TIP 3.0 CM X 190 CM: Brand: HI-TORQUE PILOT

## (undated) DEVICE — CATH TRAPPER EXCHANGE --

## (undated) DEVICE — SPLINT WR POS F/ARTERIAL ACC -- BX/10

## (undated) DEVICE — CATHETER ANGIO JR4 AD 5 FRX100 CM 25 CM PERFORMA

## (undated) DEVICE — TUBING PRSS MON L6IN PVC M FEM CONN

## (undated) DEVICE — GLIDESHEATH SLENDER ACCESS KIT: Brand: GLIDESHEATH SLENDER

## (undated) DEVICE — CATH GUID COR EB35 6FR 100CM -- LAUNCHER